# Patient Record
Sex: FEMALE | Race: WHITE
[De-identification: names, ages, dates, MRNs, and addresses within clinical notes are randomized per-mention and may not be internally consistent; named-entity substitution may affect disease eponyms.]

---

## 2016-11-20 NOTE — PD
HPI


Chief Complaint:  Psychiatric Symptoms


Time Seen by Provider:  12:43


Travel History


International Travel<30 days:  No


Contact w/Intl Traveler<30days:  No


Traveled to known affect area:  No





History of Present Illness


HPI


Patient is a 60-year-old female with a history of [place]'s disease [facility] 

[facility].  Per the report her son who also has Maricao'

s called police because his mother has been expressing suicidal ideation 

lately.  When they arrived the patient locked the doors and refused to let them 

in the sun and opened a back door and when they went in she was asking if 

euthanasia was legal in [place] and if the hospital would give her morphine so 

she could sleep and "go home to [facility] ".  The patient denies any suicide 

attempts but states that she has been considering suicide or euthanasia as an 

option when her disease progresses.  She is not in any pain and denies any 

cognitive decline.  She denies a history of depression, anxiety, bipolar 

disorder and schizophrenia.  She is a smoker but denies alcohol and illicit 

drug use.  She denies any other medical problems including cardiac or pulmonary 

disease and has no medical complaints at this time.





[facility]


Social History


Tobacco Use:  Yes





Allergies-Medications


(Allergen,Severity, Reaction):  


Coded Allergies:  


     No Known Allergies (Unverified , 11/20/16)


Reported Meds & Prescriptions








Reported Meds & Active Scripts


Active


Macrobid (Nitrofurantoin Monoh/Nitrofur Macro) 100 Mg Cap 100 Mg PO BID 5 Days











Review of Systems


Except as stated in HPI:  all other systems reviewed are Neg





Physical Exam


Narrative


GENERAL: Well-developed and well-nourished adult female in no acute distress.


SKIN: Warm and dry.  Good turgor without tenting.


HEAD: Normocephalic and atraumatic.


EYES: PERRL bilaterally, 5mm. EOMI bilateral. No injection or icterus present. 

No proptosis. Lids without edema or erythema.


ENT: Buccal mucosa pink and moist. Oropharynx free of erythema, tonsillar 

hypertrophy, masses, swelling, asymmetry and exudates. Uvula midline and airway 

patent.


NECK: Supple, no meningeal signs. Trachea midline, no JVD. No cervical or 

facial lymphadenopathy.


CARDIOVASCULAR: Regular rate and rhythm without murmurs, rubs, clicks or 

gallops. Radial and posterior tibial pulses 2+ bilaterally. No pedal edema.


RESPIRATORY: Clear to auscultation bilaterally with symmetrical rise and fall, 

no distress or use of accessory muscles.


GASTROINTESTINAL: Nontender nondistended, no discoloration.  No surgical scars 

present.  Normal bowel sounds all 4 quadrants.  No organomegaly.


MUSCULOSKELETAL: Patient freely moving all four extremities spontaneously. 

Extremities without clubbing, cyanosis, or edema. No obvious deformities. 


NEUROLOGIC: CN II-XII grossly intact. Awake and alert and oriented.  Chorea 

movements of the face, head, shoulders and hips.  Discriminate gait.  No 

dystonia.  Slightly increased muscle tone without clonus.  Bilateral sign 

present.  Normal speech.


PSYCHIATRIC: Appropriate mood and affect; insight and judgment normal.





Data


Data


Last Documented VS





Vital Signs








  Date Time  Temp Pulse Resp B/P Pulse Ox O2 Delivery O2 Flow Rate FiO2


 


11/20/16 12:34 98.6 92 24 126/68 97   








Orders





 Complete Blood Count With Diff (11/20/16 12:39)


Comprehensive Metabolic Panel (11/20/16 12:39)


Urinalysis - C+S If Indicated (11/20/16 12:39)


Drug Screen, Random Urine (11/20/16 12:39)


Alcohol (Ethanol) (11/20/16 12:39)


Salicylates (Aspirin) (11/20/16 12:39)


Tylenol (Acetaminophen) (11/20/16 12:39)


Psych Screen (11/20/16 12:39)


Urine Culture (11/20/16 12:53)


Nitrofurantoin Monohyd Macrocr (Macrobid (11/20/16 13:45)





Labs








 Laboratory Tests








Test 11/20/16 11/20/16





 12:46 12:53


 


White Blood Count 10.6 TH/MM3 


 


Red Blood Count 4.58 MIL/MM3 


 


Hemoglobin 14.2 GM/DL 


 


Hematocrit 42.0 % 


 


Mean Corpuscular Volume 91.6 FL 


 


Mean Corpuscular Hemoglobin 31.1 PG 


 


Mean Corpuscular Hemoglobin 33.9 % 





Concent  


 


Red Cell Distribution Width 13.7 % 


 


Platelet Count 393 TH/MM3 


 


Mean Platelet Volume 7.1 FL 


 


Neutrophils (%) (Auto) 56.0 % 


 


Lymphocytes (%) (Auto) 30.9 % 


 


Monocytes (%) (Auto) 6.4 % 


 


Eosinophils (%) (Auto) 5.5 % 


 


Basophils (%) (Auto) 1.2 % 


 


Neutrophils # (Auto) 5.9 TH/MM3 


 


Lymphocytes # (Auto) 3.3 TH/MM3 


 


Monocytes # (Auto) 0.7 TH/MM3 


 


Eosinophils # (Auto) 0.6 TH/MM3 


 


Basophils # (Auto) 0.1 TH/MM3 


 


CBC Comment DIFF FINAL  


 


Differential Comment   


 


Sodium Level 139 MEQ/L 


 


Potassium Level 4.0 MEQ/L 


 


Chloride Level 104 MEQ/L 


 


Carbon Dioxide Level 28.4 MEQ/L 


 


Anion Gap 7 MEQ/L 


 


Blood Urea Nitrogen 5 MG/DL 


 


Creatinine 0.67 MG/DL 


 


Estimat Glomerular Filtration 90 ML/MIN 





Rate  


 


Random Glucose 119 MG/DL 


 


Calcium Level 8.6 MG/DL 


 


Total Bilirubin 0.6 MG/DL 


 


Aspartate Amino Transf 21 U/L 





(AST/SGOT)  


 


Alanine Aminotransferase 35 U/L 





(ALT/SGPT)  


 


Alkaline Phosphatase 68 U/L 


 


Total Protein 7.0 GM/DL 


 


Albumin 3.8 GM/DL 


 


Salicylates Level 3.3 MG/DL 


 


Acetaminophen Level LESS THAN 2.0 





 MCG/ML 


 


Ethyl Alcohol Level LESS THAN 3 





 MG/DL 


 


Urine Color  LIGHT-YELLOW 


 


Urine Turbidity  HAZY 


 


Urine pH  6.5 


 


Urine Specific Gravity  1.006 


 


Urine Protein  NEG mg/dL


 


Urine Glucose (UA)  NEG mg/dL


 


Urine Ketones  NEG mg/dL


 


Urine Occult Blood  SMALL 


 


Urine Nitrite  POS 


 


Urine Bilirubin  NEG 


 


Urine Urobilinogen  LESS THAN 2.0





  MG/DL


 


Urine Leukocyte Esterase  LARGE 


 


Urine RBC  10 /hpf


 


Urine WBC  177 /hpf


 


Urine WBC Clumps  FEW 


 


Urine Squamous Epithelial  4 /hpf





Cells  


 


Urine Bacteria  MANY /hpf


 


Microscopic Urinalysis Comment  CULTURE





  INDICATED


 


Urine Opiates Screen  NEG 


 


Urine Barbiturates Screen  NEG 


 


Urine Amphetamines Screen  NEG 


 


Urine Benzodiazepines Screen  NEG 


 


Urine Cocaine Screen  NEG 


 


Urine Cannabinoids Screen  NEG 














MDM


Medical Decision Making


Medical Screen Exam Complete:  Yes


Emergency Medical Condition:  Yes


Interpretation(s)





Laboratory Tests








Test 11/20/16 11/20/16





 12:46 12:53


 


White Blood Count 10.6 TH/MM3 





 (4.0-11.0) 


 


Red Blood Count 4.58 MIL/MM3 





 (4.00-5.30) 


 


Hemoglobin 14.2 GM/DL 





 (11.6-15.3) 


 


Hematocrit 42.0 % 





 (35.0-46.0) 


 


Mean Corpuscular Volume 91.6 FL 





 (80.0-100.0) 


 


Mean Corpuscular Hemoglobin 31.1 PG 





 (27.0-34.0) 


 


Mean Corpuscular Hemoglobin 33.9 % 





Concent (32.0-36.0) 


 


Red Cell Distribution Width 13.7 % 





 (11.6-17.2) 


 


Platelet Count 393 TH/MM3 





 (150-450) 


 


Mean Platelet Volume 7.1 FL 





 (7.0-11.0) 


 


Neutrophils (%) (Auto) 56.0 % 





 (16.0-70.0) 


 


Lymphocytes (%) (Auto) 30.9 % 





 (9.0-44.0) 


 


Monocytes (%) (Auto) 6.4 % (0.0-8.0) 


 


Eosinophils (%) (Auto) 5.5 % (0.0-4.0) 


 


Basophils (%) (Auto) 1.2 % (0.0-2.0) 


 


Neutrophils # (Auto) 5.9 TH/MM3 





 (1.8-7.7) 


 


Lymphocytes # (Auto) 3.3 TH/MM3 





 (1.0-4.8) 


 


Monocytes # (Auto) 0.7 TH/MM3 





 (0-0.9) 


 


Eosinophils # (Auto) 0.6 TH/MM3 





 (0-0.4) 


 


Basophils # (Auto) 0.1 TH/MM3 





 (0-0.2) 


 


CBC Comment DIFF FINAL  


 


Differential Comment   


 


Sodium Level 139 MEQ/L 





 (136-145) 


 


Potassium Level 4.0 MEQ/L 





 (3.5-5.1) 


 


Chloride Level 104 MEQ/L 





 () 


 


Carbon Dioxide Level 28.4 MEQ/L 





 (21.0-32.0) 


 


Anion Gap 7 MEQ/L (5-15) 


 


Blood Urea Nitrogen 5 MG/DL (7-18) 


 


Creatinine 0.67 MG/DL 





 (0.50-1.00) 


 


Estimat Glomerular Filtration 90 ML/MIN (>89) 





Rate  


 


Random Glucose 119 MG/DL 





 () 


 


Calcium Level 8.6 MG/DL 





 (8.5-10.1) 


 


Total Bilirubin 0.6 MG/DL 





 (0.2-1.0) 


 


Aspartate Amino Transf 21 U/L (15-37) 





(AST/SGOT)  


 


Alanine Aminotransferase 35 U/L (10-53) 





(ALT/SGPT)  


 


Alkaline Phosphatase 68 U/L () 


 


Total Protein 7.0 GM/DL 





 (6.4-8.2) 


 


Albumin 3.8 GM/DL 





 (3.4-5.0) 


 


Salicylates Level 3.3 MG/DL 





 (2.8-20.0) 


 


Acetaminophen Level LESS THAN 2.0 





 MCG/ML 





 (10.0-30.0) 


 


Ethyl Alcohol Level LESS THAN 3 





 MG/DL (0-5) 


 


Urine Color  LIGHT-YELLOW





  (YELLW/STRAW)


 


Urine Turbidity  HAZY  (CLEAR)


 


Urine pH  6.5  (5.0-8.5)


 


Urine Specific Gravity  1.006





  (1.002-1.035)


 


Urine Protein  NEG mg/dL





  (NEG-TRACE)


 


Urine Glucose (UA)  NEG mg/dL (NEG)


 


Urine Ketones  NEG mg/dL (NEG)


 


Urine Occult Blood  SMALL  (NEG)


 


Urine Nitrite  POS  (NEG)


 


Urine Bilirubin  NEG  (NEG)


 


Urine Urobilinogen  LESS THAN 2.0





  MG/DL (LESS





  THAN 2.0)


 


Urine Leukocyte Esterase  LARGE  (NEG)


 


Urine RBC  10 /hpf (0-3)


 


Urine WBC  177 /hpf (0-5)


 


Urine WBC Clumps  FEW  (NONE)


 


Urine Squamous Epithelial  4 /hpf (0-5)





Cells  


 


Urine Bacteria  MANY /hpf





  (NONE)


 


Microscopic Urinalysis Comment  CULTURE





  INDICATED


 


Urine Opiates Screen  NEG  (NEG)


 


Urine Barbiturates Screen  NEG  (NEG)


 


Urine Amphetamines Screen  NEG  (NEG)


 


Urine Benzodiazepines Screen  NEG  (NEG)


 


Urine Cocaine Screen  NEG  (NEG)


 


Urine Cannabinoids Screen  NEG  (NEG)








Differential Diagnosis


SI versus depression versus anxiety versus bipolar disorder versus 

schizophrenia versus  substance abuse versus mood disorder versus personality 

disorder versus adjustment disorder


Narrative Course


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 

in by [place] for expressing suicidal ideation.  Patient admits to wanting to 

seek options for a euthanasia in the future as her diseases progressing and 

fatal.  She denies any acute worsening and she is on any pain.  She denies any 

history of mental illness.  She denies any suicide attempts.  She is pleasant 

and answers questions and denies any medical problems at this time.  Denies any 

neurological findings from her Maricao's disease her exam is normal.  

Ordered labs per protocol.  CBC shows a WBC of 10.6 without a neutrophilia or 

bands.  H&H normal.  Metabolic panel shows BUN 5, glucose 119.  Serum benefit 

level less than 2, salicylates 3.3, ethanol less than 3.  Urinalysis shows 

positive for nitrites, leukocyte esterase, 10 RBCs, 177 WBCs with clumps and 

many bacteria.  The patient continues to deny any  symptoms and her abdominal 

exam is benign.  She is afebrile and nontoxic appearing.  First dose of 

Macrobid given here with a prescription for the remainder.  Heart rate when 

vitals were performed was 92 however on exam was 84 and regular.  She has no 

leukocytosis, is afebrile and nontoxic-appearing and has no systemic complaints 

no additional workup is needed at this time.  Patient is medically cleared to 

proceed with psych evaluation.





Diagnosis





 Primary Impression:  


 Cystitis


 Additional Impressions:  


 Suicidal ideations


 Maricao disease


Patient Instructions:  General Instructions, Urinary Tract Infection in Women (

ED)





***Additional Instructions:


Take medication as prescribed


Drink plenty of fluids to stay hydrated and flush urinary system


Call for culture results and follow up with PCP in 48 hours


Return to the ED for any acute worsening of symptoms including fever, nausea, 

vomiting


***Med/Other Pt SpecificInfo:  Prescription(s) given


Scripts


Nitrofurantoin Monohydrate Macrocrystals (Macrobid)100 Mg [id number] Mg PO BID  5 

Days  Ref 0


   Prov:Osmar Gomez MD         11/20/16


Condition:  Stable








Gage Russo III Nov 20, 2016 12:50

## 2016-11-21 NOTE — HHI.HP
Provisional Diagnosis


Admission Date





Axis I.


Major depressive disorder, single episode, without psychotic features versus 

medical induced mood disorder


Axis II.


Deferred


Axis III.


Ciarra disease


Axis IV.


Family conflicts


Axis V.


40





                               Certification of Person's Competence 


                           To Provide Express and Informed Consent





I have personally examined Angelic Joseph , a person being served at [facility] on, Nov 21, 2016 10:11.


Express and informed consent means consent voluntarily given in writing, by a 

competent person, after sufficient explanation and disclosure of the subject 

matter involved to enable the person to make a knowing and willful decision 

without any element of force, fraud, deceit, duress, or other form of 

constraint or coercion.





This person is 18 years of age or older, is not now known to be incompetent to 

consent to treatment with a guardian advocate, and does not have a health care 

surrogate or proxy currently making medical treatment decisions.  I have found 

this person to be one of the following:





[] Competent to provide express and informed consent, as defined above, for 

voluntary admission to this facility and is competent to provide express and 

informed consent for treatment.  He/she has the consistent capacity to make 

well reasoned, willful, and knowing decisions concerning his or her medical or 

mental health treatment.  The person fully and consistently understands the 

purpose of the admission for examination/placement and is fully capable of 

personally exercising all rights [place]





[] Incompetent to provide express and informed consent to voluntary admission, 

and this is incompetent to provide express and informed consent to treatment.  

The person must be transferred to involuntary status and a petition for a 

guardian advocate filed with the Circuit Court.





[X] Refusing to provide express and informed consent to voluntary admission but 

is competent to provide express and informed consent for treatment.  The person 

must be discharged or transferred to involuntary status.





Form shall be completed within 24 hours of a person's arrival at the receiving 

facility and filed in the clinical record of each person:


1. Admitted on a voluntary basis


2. Permitted to provide express and informed consent to his/her own treatment


3. Allowed to transfer from involuntary to voluntary status


4. Prior to permitting a person to consent to his or her own treatment after 

having been previously found incompetent to consent to treatment.





History of Present Illness


Capacity:  Has Capacity


HPI


The patient is a 60-year-old  woman, domicile with her son, unemployed

, disabled, without any previous psychiatric history, no previous suicidal 

attempts, medical history of [place]'s disease [facility].  Per the report her son who also has [place]'s called police 

because his mother has been expressing suicidal ideation lately.  When they 

arrived the patient locked the doors and refused to let them in the sun and 

opened a back door and when they went in she was asking if euthanasia was legal 

in [place] and if the hospital would give her morphine so she could sleep and 

"go home to [facility] ".  As per ER notes, The patient denies any suicide attempts 

but states that she has been considering suicide or euthanasia as an option 

when her disease progresses.  Chart was reviewed, case discussed with ER 

medical staff, no collateral information available, patient was seen and 

evaluated in the [facility].  On evaluation patient states that she doesn't need to 

be here, she is states that the reason she was brought to the hospital is 

because her son doesn't want to pay the rent and wants to get rid of her.  

Patient denies depressive symptoms, she denies anhedonia, she denies 

hopelessness, helplessness, she denies suicidal or homicidal ideation.  However

, she does states that soon or later as per Ciarra's disease is 

deteriorated her health she is going to look for options to end her life in a 

painless manner, but she is now planning to commit suicide.  At this moment, 

she says, she still enjoy life, is able to function, to take care of herself, 

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 

these abilities.  Patient denies past or current devonte, denies anxiety, denies 

perceptual disturbances, such as visual and auditory hallucinations.  Patient 

is fully oriented 3, no gross cognitive impairment observed.





Review of Systems


Constitutional:  DENIES: Diaphoretic episodes, Fatigue, Fever, Weight gain, 

Weight loss, Chills, Dizziness, Change in appetite, Night Sweats


Eyes:  DENIES: Blurred vision, Diplopia, Eye inflammation, Eye pain, Vision loss

, Photosensitivity, Double Vision


Ears, nose, mouth, throat:  DENIES: Tinnitus, Hearing loss, Vertigo, Nasal 

discharge, Oral lesions, Throat pain, Hoarseness, Ear Pain, Running Nose, 

Epistaxis, Sinus Pain, Toothache, Odynophagia


Respiratory:  DENIES: Apneas, Cough, Snoring, Wheezing, Hemoptysis, Sputum 

production, Shortness of breath


Cardiovascular:  DENIES: Chest pain, Palpitations, Syncope, Dyspnea on Exertion

, PND, Lower Extremity Edema, Orthopnea, Claudication


Gastrointestinal:  DENIES: Abdominal pain, Black stools, Bloody stools, 

Constipation, Diarrhea, Nausea, Vomiting, Difficulty Swallowing, Anorexia


Genitourinary:  DENIES: Abnormal vaginal bleeding, Dysmenorrhea, Dyspareunia, 

Sexual dysfunction, Urinary frequency, Urinary incontinence, Urgency, Hematuria

, Dysuria, Nocturia, Vaginal discharge


Musculoskeletal:  DENIES: Joint pain, Muscle aches, Stiffness, Joint Swelling, 

Back pain, Neck pain


Integumentary:  DENIES: Abnormal pigmentation, Pruritus, Rash, Nail changes, 

Breast masses, Breast skin changes, Nipple discharge


Immunologic/allergic:  DENIES: Eczema, Urticaria


Psychiatric:  DENIES: Anxiety, Confusion, Mood changes, Depression, 

Hallucinations, Agitation, Suicidal Ideation, Homicidal Ideation, Delusions


Other


Neurology: [place] chorea observed, with involuntary movement, lack of 

coordination and unsteady gait observed.





Past Psych History


Violence risk - self (6 mos)


Elevated





Substance Abuse History


Drugs/Alcohol past 12 months


She denies





Past Family Social History


Coded Allergies:  


     No Known Allergies (Unverified , 11/20/16)


Active Scripts


Nitrofurantoin Monohydrate Macrocrystals (Macrobid)100 Mg [id number] Mg PO BID  5 

Days  Ref 0


   Prov:Osmar Gomez MD         11/20/16





 Current Medications








 Medications


  (Trade)  Dose


 Ordered  Sig/Joe


 Route  Start Time


 Stop Time Status Last Admin


 


  (Macrobid)  100 mg  ONCE  ONCE


 PO  11/21/16 10:15


 11/21/16 10:16   


 


 


  (Ativan)  1 mg  Q6H  PRN


 PO  11/21/16 10:15


   UNV  


 


 


  (Ativan Inj)  1 mg  Q6H  PRN


 IM  11/21/16 10:15


   UNV  


 


 


  (Ativan)  0.5 mg  Q12H  PRN


 PO  11/21/16 10:15


   UNV  


 


 


  (Ativan Inj)  0.5 mg  Q12H  PRN


 IM  11/21/16 10:15


   UNV  


 


 


  (Tylenol)  650 mg  Q4H  PRN


 PO  11/21/16 10:15


   UNV  


 


 


  (Milk Of


 Magnesia Liq)  30 ml  DAILY  PRN


 PO  11/21/16 10:15


   UNV  


 


 


  (Mag-Al Plus


 Susp Liq)  30 ml  Q6H  PRN


 PO  11/21/16 10:15


   UNV  


 


 


  (Habitrol 21 Mg


 Patch.24 Hr)  1 patch  DAILY


 T-DERMAL  11/22/16 09:00


   UNV  


 








Family History


Her father, 2 of her siblings, her 2 kids have [place] disease


Social History


Patient was born and raised in [place], she lives with her son, she is single

, unemployed, has been living in [place] for 8 years





Physical Exam


Vital Signs





 Vital Signs








  Date Time  Temp Pulse Resp B/P Pulse Ox O2 Delivery O2 Flow Rate FiO2


 


11/21/16 06:19  70  115/69    


 


11/21/16 01:58   16     


 


11/20/16 17:30 97.6    96 Room Air  











Mental Status Examination


Appearance


 woman, skinny, who appears her stated age, she is cooperative, a 

little bit irritable, uncoordinated movement, chorea, unsteady gait observed


Speech:  Slow, Stuttering


Orientation:  x3


Memory:  Unremarkable


Thought Process:  Logical


Thought Content:  Unremarkable


Hallucination Type:  None


Attention and Concentration:  Good


Suicidal Ideation:  No


Previous Suicide Attempts:  No


Homicidal Ideation:  No


Previous Homicide Attempts:  No


Insight:  Fair


Judgement:  WNL


Affect:  Irritable


Mood:  Angry


Motor Activity:  Abnormal gait-specify (unsteady gait)





Problem List:  


(1) Major depressive disorder


Assessment & Plan:  On evaluation patient is irritable, she denies suicidal 

ideation, she denies depressive symptoms, denies devonte and perceptual 

disturbances.  However, Haddad act documentation shows that her kids has been 

very concerned because patient has been expressing suicidal ideation.  On 

evaluation today, even though she doesn't express suicidal ideation, she does 

report the desire of ending her life "in a painless manner"as her physical 

condition is deteriorating due to Ciarra disease.  On the evaluation no 

gross cognitive impairment or angela psychosis is observed.  Neuropsychiatric 

symptoms are very well-known consequences of Ciarra disease, but this does 

not seem to be the case in this patient.  I think that the patient has an 

existential SI due to her severe and debilitating medical condition and not due 

specifically to depression.  She poses a very high risk for suicidality.  

Collateral information from her kids and more longitudinal observation is 

crucial in order to complete the psychiatric assessment and and completed the 

picture.  Meanwhile, due to her elevated risk for suicidality, patient needs 

psychiatric admission.


ICD Code:  F32.9


Assessment & Plan


Estimated LOS:  days





Problem Qualifiers





(1) Major depressive disorder:  





Jorge Taylor MD Nov 21, 2016 10:30

## 2016-11-22 NOTE — PD.CONS
Provisional Diagnosis


Admission Date


Nov 21, 2016 at 10:11


[place] I.


Major depressive disorder, single episode, without psychotic features versus 

medical induced mood disorder


Axis II.


Deferred


Axis III.


Culebra disease


Axis IV.


Family conflicts


Axis V.


40





History of Present Illness


Service


Psychiatry


Consult Requested By





Primary Care Physician


No Primary Care Physician


HPI


The patient is a 60-year-old  woman, domicile with her son, unemployed

, disabled, without any previous psychiatric history, no previous suicidal 

attempts, medical history of Culebra's disease [facility] 

[facility]addad act.  Per the report her son who also has Culebra's called police 

because his mother has been expressing suicidal ideation lately.  When they 

arrived the patient locked the doors and refused to let them in the sun and 

opened a back door and when they went in she was asking if euthanasia was legal 

in [place] and if the hospital would give her morphine so she could sleep and 

"go home to [facility] ".  As per ER notes, The patient denies any suicide attempts 

but states that she has been considering suicide or euthanasia as an option 

when her disease progresses.  Chart was reviewed, case discussed with ER 

medical staff, no collateral information available, patient was seen and 

evaluated in the [facility].  On evaluation patient states that she doesn't need to 

be here, she is states that the reason she was brought to the hospital is 

because her son doesn't want to pay the rent and wants to get rid of her.  

Patient denies depressive symptoms, she denies anhedonia, she denies 

hopelessness, helplessness, she denies suicidal or homicidal ideation.  However

, she does states that soon or later as per Culebra's disease is 

deteriorated her health she is going to look for options to end her life in a 

painless manner, but she is now planning to commit suicide.  At this moment, 

she says, she still enjoy life, is able to function, to take care of herself, 

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 

these abilities.  Patient denies past or current devonte, denies anxiety, denies 

perceptual disturbances, such as visual and auditory hallucinations.  Patient 

is fully oriented 3, no gross cognitive impairment observed. 





11/22/16


Above note dictated by Dr. Herron review noted and agreed with.  Patient is 

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 Herron 

to Dr. johnson nurse service.  Patient chart reviewed and patient seen with nurse 

eye on unit.  Dr. membreno his sign first opinion petition supporting Haddad act.  

I agree.  Patient meets criteria for involuntary psychiatric hospitalization 

under the [place] act thus I will cosign second opinion petition supporting Haddad 

act





Past Family Social History


Coded Allergies:  


     No Known Allergies (Unverified , 11/20/16)


Active Scripts


Nitrofurantoin Monohydrate Macrocrystals (Macrobid)100 Mg Xrg802 Mg PO BID  5 

Days  Ref 0


   Prov:Osmar Gomez MD         11/20/16





 Current Medications








 Medications


  (Trade)  Dose


 Ordered  Sig/Joe


 Route  Start Time


 Stop Time Status Last Admin


 


  (Ativan)  1 mg  Q6H  PRN


 PO  11/21/16 10:15


     


 


 


  (Ativan Inj)  1 mg  Q6H  PRN


 IM  11/21/16 10:15


     


 


 


  (Ativan)  0.5 mg  Q12H  PRN


 PO  11/21/16 10:15


     


 


 


  (Ativan Inj)  0.5 mg  Q12H  PRN


 IM  11/21/16 10:15


     


 


 


  (Tylenol)  650 mg  Q4H  PRN


 PO  11/21/16 10:15


     


 


 


  (Milk Of


 Magnesia Liq)  30 ml  DAILY  PRN


 PO  11/21/16 10:15


     


 


 


  (Mag-Al Plus


 Susp Liq)  30 ml  Q6H  PRN


 PO  11/21/16 10:15


     


 


 


  (Habitrol 21 Mg


 Patch.24 Hr)  1 patch  DAILY


 T-DERMAL  11/22/16 09:00


    11/22/16 09:00


 


 


  (SEROquel)  25 mg  BID@09,12


 PO  11/22/16 09:00


    11/22/16 09:46


 


 


  (SEROquel)  100 mg  HS


 PO  11/22/16 21:00


     


 











Physical Exam


Vital Signs





 Vital Signs








  Date Time  Temp Pulse Resp B/P Pulse Ox O2 Delivery O2 Flow Rate FiO2


 


11/22/16 05:47 98.4 64 16 103/63 97   


 


11/20/16 17:30      Room Air  











Mental Status Examination


Alert irritable paranoid thin slender white female with significant chorea type 

movements both upper extremities upper torso head and neck guarded with me


Appearance


Somewhat disheveled obvious Ciarra's chorea type movements noted


Speech:  Slow, Stuttering


Orientation:  x3


Memory:  Unremarkable


Thought Process:  Logical


Thought Content:  Unremarkable


Language


Fair


Fund of Knowledge


Fair


Hallucination Type:  None


Attention and Concentration:  Good


Suicidal Ideation:  No (denies at this time)


Previous Suicide Attempts:  No


Homicidal Ideation:  No


Previous Homicide Attempts:  No


Insight:  Poor


Judgement:  WNL (fair)


Affect:  Irritable


Mood:  Angry


Motor Activity:  Abnormal gait-specify (unsteady gait)





Problem List:  


(1) Major depressive disorder


ICD Code:  F32.9


Assessment & Plan


Estimated LOS:  days





Problem Qualifiers





(1) Major depressive disorder:  





Gage Bob MD Nov 22, 2016 11:11

## 2016-11-22 NOTE — HHI.PYPN
Subjective


Remarks


Patient seen, chart reviewed, case discussed with staff


Patient is taking her Seroquel 25/25/100 today.  She reports she finds it 

calming and she reports she likes it and will continue to take it.


Patient presently is continuing to talk to me about suicidal thoughts.  She is 

talking about ordering poisonous herbs from the Internet after she is discharged

; for example she mentioned catnip and dandelion roots which she believes she 

can order online.  This train of thought, which she is continuing in even when 

she reports feeling better today, is worrisome.


Daughter called and gave report of mom being aggressive and overwhelmed at 

home.  Daughter feels mom would do better if placed in assisted living.  Mom 

also reports she does not feel she should return to the family home.


Patient continues to deny depression,  Says she wants to kill herself as part 

of a desire to move on in a positive fashion.





Review of Systems


Neurologic:  COMPLAINS OF: Tremor


Psychiatric:  COMPLAINS OF: Anxiety, Confusion, Mood changes, Depression, 

Suicidal Ideation


Other


otherwise 10 point ROS is negative





Objective


Alert:  Yes


[place]:  Person, Place, Situation


Mood:  Agitated, Anxious, Depressed


Affect:  Labile


Memory Intact:  Immediate


Hallucinations:  Other (patient denies)


Delusions:  Yes


Delusion Type:  Other


Suicidal:  Ideation (prominent death wishes)


Homicidal:  Ideation (denies)


Insight/Judgement


odd views about death and dying


Labs











Test 11/22/16





 08:41


 


Sodium Level 137 MEQ/L


 


Potassium Level 3.8 MEQ/L


 


Chloride Level 98 MEQ/L


 


Carbon Dioxide Level 28.6 MEQ/L


 


Anion Gap 10 MEQ/L


 


Blood Urea Nitrogen 9 MG/DL


 


Creatinine 0.81 MG/DL


 


Estimat Glomerular Filtration 73 ML/MIN





Rate 


 


Random Glucose 129 MG/DL


 


Hemoglobin A1c 5.1 %


 


Calcium Level 9.2 MG/DL


 


Triglycerides Level 113 MG/DL


 


Cholesterol Level 193 MG/DL


 


LDL Cholesterol 80 MG/DL


 


HDL Cholesterol 90.9 MG/DL


 


Cholesterol/HDL Ratio 2.12 RATIO














 Date/Time Procedure Status





Source Growth 


 


 11/20/16 12:53 Urine Culture - Final Complete





Urine Clean Catch Escherichia Coli 








Vitals/IOs





 Vital Signs








  Date Time  Temp Pulse Resp B/P Pulse Ox O2 Delivery O2 Flow Rate FiO2


 


11/22/16 05:47 98.4 64 16 103/63 97   


 


11/20/16 17:30      Room Air  











Problem List:  


(1) Major depressive disorder, recurrent, severe with psychotic features


Assessment & Plan:  I believe this patients strong death wish is a strong 

indication of severe hopelessness and I consider this a psychotic feature.  In 

addition I consider her thoughts about dying odd and fundamentally psychotic.  

I believe neuroleptic treatment is indicated and I am happy to say she feels 

Seroquel is relieving what she refers to as her anxiety.  We will continue with 

this and raise this as needed.  Augmenting with antidepressants as patient 

improves and becomes more accepting of medications.


ICD Code:  F33.3


Assessment & Plan


Estimated LOS:  days








Anu Ribeiro MD Nov 22, 2016 16:42

## 2016-11-23 NOTE — HHI.PYPN
Subjective


Remarks


Patient seen, chart reviewed, case discussed with staff


Patient reports she is much better on the 25/25/100 Seroqeul.  She is also 

reporting that her daughter wants her back home to live with her, however the 

phone number she gave me is a non working number.  Furthermore, when I said 

that social work would have to speak to her daughter and make sure it is a safe 

discharge she suddenly got violently angry and started talking about killing 

herself again..  Suggesting problematic lability and a worrisome low 

frustration tolerance.


Patient has been isolating in bed much of the day.  Does not complain of 

medication side effects.





Review of Systems


Psychiatric:  COMPLAINS OF: Anxiety, Confusion, Mood changes, Depression, 

Agitation, Suicidal Ideation


Other


otherwise 10 point ROS is negative





Objective


Alert:  Yes


[place]:  Person, Place, Situation


Mood:  Agitated, Anxious, Depressed


Affect:  Labile


Memory Intact:  Immediate


Hallucinations:  Other (patient denies)


Delusions:  Yes


Delusion Type:  Other


Suicidal:  Ideation (continues)


Homicidal:  Ideation (denies)


Insight/Judgement


poor


Labs











 Date/Time Procedure Status





Source Growth 


 


 11/20/16 12:53 Urine Culture - Final Complete





Urine Clean Catch Escherichia Coli 








Vitals/IOs





 Vital Signs








  Date Time  Temp Pulse Resp B/P Pulse Ox O2 Delivery O2 Flow Rate FiO2


 


11/23/16 06:11 97.2 72 18 91/74 96   


 


11/20/16 17:30      Room Air  











Problem List:  


(1) Major depressive disorder, recurrent, severe with psychotic features


Assessment & Plan:  patient is still very unstable.  I have not spoken to her 

daughter to verify that Angelic does have a place to go home to.  Will have 

social work look into this.  


There is improvement with the Seroquel, however the patient is not yet stable 

yet.  Will attempt to improve stability, however I am concerned that patient is 

sleep on Seroquel (ie staying in room in bed) and will wait to increase, and or 

consider other meds in daytime.


ICD Code:  F33.3


Assessment & Plan


Estimated LOS:  Anu Gonzalez MD Nov 23, 2016 14:45

## 2016-11-24 NOTE — HHI.PYPN
Subjective


Remarks


Patient seen, chart reviewed, case discussed with staff.


Patient seen on both 2600 and 2700 today.  Patient has been demanding immediate 

release, but has given me a phone number which she reports is that of her 

daughter and which no one answers.  In addition she continues to threaten to 

kill herself if things do not go her way.  


Patient was trying to escape the Unit.  She is psychotic and talking about 

 and people who want her to come live with them.





Review of Systems


Neurologic:  COMPLAINS OF: Tremor, Poor Balance


Psychiatric:  COMPLAINS OF: Anxiety, Confusion, Mood changes, Depression, 

Agitation, Suicidal Ideation, Delusions


Other


otherwise 10 point ROS is negative





Objective


Alert:  Yes


[place]:  Person, Place, Situation


Mood:  Agitated, Anxious, Depressed


Affect:  Labile


Memory Intact:  Immediate


Hallucinations:  Other (patient denies)


Delusions:  Yes


Delusion Type:  Other


Suicidal:  Ideation (continues)


Homicidal:  Ideation (denies)


Insight/Judgement


poor


Labs











 Date/Time Procedure Status





Source Growth 


 


 11/20/16 12:53 Urine Culture - Final Complete





Urine Clean Catch Escherichia Coli 








Vitals/IOs





 Vital Signs








  Date Time  Temp Pulse Resp B/P Pulse Ox O2 Delivery O2 Flow Rate FiO2


 


11/24/16 05:57 97.3 71 20 104/64 100   


 


11/20/16 17:30      Room Air  











Problem List:  


(1) Major depressive disorder, recurrent, severe with psychotic features


Assessment & Plan:  The patient is expressing more psychotic feature than were 

originally noticed.  In addition she is now refusing medication and insisting 

on leaving the hospital.  It appears likely that she needs to have a Guardian 

Advocate appointed to approve medications, and she may need some ETOs if her 

behavioral dyscontrol continues.  She has been transferred to the 2700 Unit so 

she can be observed in the Camera rooms.


ICD Code:  F33.3


Assessment & Plan


Estimated LOS:  days








Anu Ribeiro MD Nov 24, 2016 14:24

## 2016-11-25 NOTE — HHI.PYPN
Subjective


Remarks


I am assuming care of patient from Dr. Ribeiro.  Patient seen and examined with 

counselor Lea.  Chart reviewed.  Case discussed with nursing staff.  Patient 

was apparently transferred from the 2600 to the 2700 unit because she became 

agitated and threw food at staff.  Behavior has been under better control since 

transferred to the 2700 unit.  On my examination today, the patient presents as 

intensely dysphoric and paranoid.  She is impulsive with and seems to exhibit 

impaired executive function.  She says "I don't need to be here.  I plan to get 

the owner of this place involved.  You're forcing me onto drugs.  I'll place is 

going to be closed down, sweetie.  You stole me from my home.  Get a heart.  

Get a heart.  Mireille Delacruz."  She says she wants to be discharged, and she 

will go home and "let go" of her life.  She says the Seroquel, "turns me into la

-la," and I note she has stopped taking it.  





Spoke with patient's son, Daniel Noe, over the phone [phone]: He 

reports pt has been symptomatic from HD for 5-10 years but has been steadily 

declining in the last 2-3 years.  He notes that she has had "phases" of 

paranoia and aggressive behavior in the past.  She thought her water was 

poisoned at one point and refused to shower, he says.  He notes that she has 

made prior threats of suicide but has never acted on a suicide plan.  He notes 

she often refuses medications and does not like doctors or hospitals.  He needs 

to talk it over with his sister, but he is thinking pt may need placement.  He 

is eldest child and pt is reportedly unmarried, and he agrees to act as HCS/GA.

  He thanks me for the call.





Also tried to call daughter, Kirti Noe at [phone], no answer.





Review of Systems


ROS Limitations:  Uncooperative, Poor Historian


Other


No reported somatic complaints.





Objective


Alert:  Yes


[place]:  Person, Place


Mood:  Agitated, Angry, Anxious


Affect:  Restricted (dysphoric)


Memory Intact:  Comment (Perhaps mild impairment on clinical exam but not 

formally assessed.)


Hallucinations:  Other (No AVH)


Delusions:  Yes


Delusion Type:  Paranoid


Suicidal:  Ideation (Ongoing SI)


Homicidal:  Ideation (No HI)


Insight/Judgement


Poor


Remarks


Chorea evident.  Speech wnl for rate, tone, volume.  Thought process 

perseverative.


Labs











 Date/Time Procedure Status





Source Growth 


 


 11/20/16 12:53 Urine Culture - Final Complete





Urine Clean Catch Escherichia Coli 





Labs reviewed.  CBC, CMP, and toxicology results reviewed.  UA and culture 

results reviewed.  I note patient has not been continued on Macrobid tx for UTI.


Vitals/IOs





 Vital Signs








  Date Time  Temp Pulse Resp B/P Pulse Ox O2 Delivery O2 Flow Rate FiO2


 


11/25/16 06:06 97.2 63 16 115/78 98   











Problem List:  


(1) [place] disease


ICD Code:  G10


(2) Dementia


ICD Code:  F03.90


Assessment & Plan


History provided by son as well as patient's current presentation is concerning 

for a subcortical dementia from her [place] Disease, with behavioral 

disturbance, and I will adjust the provisional diagnosis accordingly.  There 

may also be a component of affective disorder, but this is perhaps secondary to 

the HD as well.  I will replace her Seroquel with Zyprexa PO/IM given 

nonadherence with Seroquel.  I will consult PT and initiate fall precautions.  

Consult to neurology to assess status of patient's HD.  Resume Macrobid x 7 

days for treatment of pan-sensitive E. coli UTI.  Transfer to 2500 unit when a 

bed is available; d/w Dr. Bob.  Utilize HCS/GA.  Continue other 

medications and care as ordered.


Discharge Planning


?Placement


Request HC Surrog/Guard Advoc?:  Yes





Problem Qualifiers





(1) Dementia:  


Qualified Code:  F02.81 - Dementia associated with other underlying disease 

with behavioral disturbance





José Mccray MD Nov 25, 2016 09:35

## 2016-11-25 NOTE — MB
cc:

HANNA HULL

****

 

 

DATE OF CONSULTATION

11/25/2016

 

REASON FOR CONSULTATION

Patient with history of Door disease and the question is any medications

that may be of benefit and the admitting consultant wants to inquire whether

the patient has a cortical dementia of Door's disease.

 

HISTORY OF PRESENT ILLNESS

I went to see the patient in the Psychiatry Unit. I met with the patient and

she was in the lobby and she was asked by the nurse to go back to her room.

She was evidently with choreiform movements involving the whole body.  However,

the patient denied that she needs to see a neurologist, "I do not want to see a

neurologist.  I know my diagnosis and all my family from my mother's side had

been diagnosed with Door's."  I explained to the patient why I am

consulted and why I am seeing her.  She declined taking any medication and she

did not want to be examined and she refused evaluation and assessment.

 

I let the nurse know about the situation and the nurse wanted to know about the

capacity of the patient to make decisions.  My answer is that this needs

further evaluation through mental status assessment and cooperative test that

is usually done in an outpatient setting and also can be done in inpatient

setting by a neurobehavioral expert.  I informed the nurse that of further

assistance is needed and the patient is agreeable to being reevaluated, I would

be more happy to see her again.

 

 

 

                              _________________________________

                              MD ROSIBEL Marsh/BENJAMIN

D:  11/25/2016/10:52 PM

T:  11/25/2016/11:39 PM

Visit #:  [id number]

Job #:  [id number]

MARILYN

## 2016-11-26 NOTE — HHI.PYPN
Subjective


Remarks


Pt seen and discussed with staff. She has been agitated and aggressive with 

staff.. She received Ativan IM x1 due to aggression. She states that the FBI is 

waiting upstairs to arrest RN staff. Interview cut short due to escalating 

agitation. No SI/HI.





Review of Systems


Psychiatric:  COMPLAINS OF: Agitation, Delusions





Objective


Alert:  Yes


[place]:  Person, Place


Mood:  Agitated, Angry, Anxious


Affect:  Restricted (dysphoric)


Memory Intact:  Comment (Perhaps mild impairment on clinical exam but not 

formally assessed.)


Hallucinations:  Other (No AVH)


Delusions:  Yes


Delusion Type:  Paranoid


Suicidal:  Ideation (Ongoing SI)


Homicidal:  Ideation (No HI)


Insight/Judgement


poor


Vitals/IOs





 Vital Signs








  Date Time  Temp Pulse Resp B/P Pulse Ox O2 Delivery O2 Flow Rate FiO2


 


11/25/16 20:22 97.4 93 18 92/52 95   








 Intake and Output








 11/25/16 11/25/16 11/26/16





 08:00 16:00 00:00


 


Intake Total   1080 ml


 


Balance   1080 ml











Problem List:  


(1) [place] disease


ICD Code:  G10


(2) Dementia


ICD Code:  F03.90


Assessment & Plan


Continue current tx plan. Estimated LOS:  days


Request HC Surrog/Guard Advoc?:  Yes





Problem Qualifiers





(1) Dementia:  


Qualified Code:  F02.81 - Dementia associated with other underlying disease 

with behavioral disturbance





Clarice Torres MD Nov 26, 2016 21:17

## 2016-11-27 NOTE — HHI.PYPN
Subjective


Remarks


Pt seen and discussed with staff. She continues to refuse medications but has 

been less agitated and hostile. Today pt has been out of room in dayroom 

engaging in unit activities at times. No SI/HI





Objective


Alert:  Yes


[place]:  Person, Place


Mood:  Anxious


Affect:  Labile


Memory Intact:  Comment (mild impairment)


Hallucinations:  Other (No AVH)


Delusions:  Yes


Delusion Type:  Paranoid


Suicidal:  Ideation (Ongoing SI)


Homicidal:  Ideation (No HI)


Insight/Judgement


poor


Vitals/IOs





 Vital Signs








  Date Time  Temp Pulse Resp B/P Pulse Ox O2 Delivery O2 Flow Rate FiO2


 


11/27/16 19:31 98.1 80 17 137/57 97   








 Intake and Output








 11/26/16 11/26/16 11/27/16





 08:00 16:00 00:00


 


Intake Total 980 ml 720 ml 360 ml


 


Balance 980 ml 720 ml 360 ml











Problem List:  


(1) [place] disease


ICD Code:  G10


(2) Dementia


ICD Code:  F03.90


Assessment & Plan


Continue current tx plan. Estimated LOS:  days


Request HC Surrog/Guard Advoc?:  Yes





Problem Qualifiers





(1) Dementia:  


Qualified Code:  F02.81 - Dementia associated with other underlying disease 

with behavioral disturbance





Clarice Torres MD Nov 27, 2016 19:38

## 2016-11-28 NOTE — HHI.PYPN
Subjective


Remarks


Patient check to my care on unit 2500, patient discussed with treatment team 

today chart review today patient seen on unit.  Patient quite angry and 

paranoid after anxious myself she stormed off saying I don't need a doctor.  

Will have counselor talk with patient's family about possible use of Zyprexa by 

mouth or IM





Review of Systems


Other


No somatic complaints noted today





Objective


Alert:  Yes


[place]:  Person, Place


Mood:  Anxious


Affect:  Labile


Memory Intact:  Comment (mild impairment)


Hallucinations:  Other (No AVH)


Delusions:  Yes


Delusion Type:  Paranoid


Suicidal:  Ideation (Ongoing SI)


Homicidal:  Ideation (No HI)


Insight/Judgement


Very poor


Vitals/IOs





 Vital Signs








  Date Time  Temp Pulse Resp B/P Pulse Ox O2 Delivery O2 Flow Rate FiO2


 


11/27/16 19:31 98.1 80 17 137/57 97   








 Intake and Output








 11/27/16 11/27/16 11/27/16





 07:59 15:59 23:59


 


Intake Total 0 ml 1440 ml 480 ml


 


Balance 0 ml 1440 ml 480 ml











Assessment & Plan


Problem List:  


(1) [place] disease


ICD Code:  G10


(2) Dementia


ICD Code:  F03.90


Assessment & Plan


Estimated LOS:  days patient continues quite paranoid and confused.  Will 

attempt to get permission from family to initiate Zyprexa


Discharge Planning


To be determined


Request HC Surrog/Guard Advoc?:  Yes





Problem Qualifiers





(1) Dementia:  


Qualified Code:  F02.81 - Dementia associated with other underlying disease 

with behavioral disturbance





Gage Bob MD Nov 28, 2016 15:06

## 2016-11-29 NOTE — HHI.PYPN
Subjective


Remarks


Attempted to speak with patient in dayroom today she continues angry paranoid 

refusing to speak with me.  Patient remains [place] noncompliant with 

medications though it appears she did take her Zyprexa at at bedtime last 

night.  For now continue treatment





Review of Systems


Other


No somatic complaints today





Objective


Alert:  Yes


[place]:  Person, Place


Mood:  Anxious


Affect:  Labile


Memory Intact:  Comment (mild impairment)


Hallucinations:  Other (No AVH)


Delusions:  Yes


Delusion Type:  Paranoid


Suicidal:  Ideation (Ongoing SI)


Homicidal:  Ideation (No HI)


Insight/Judgement


Very poor


Vitals/IOs





 Vital Signs








  Date Time  Temp Pulse Resp B/P Pulse Ox O2 Delivery O2 Flow Rate FiO2


 


11/29/16 06:37 98.0 60 18 100/65 99   








 Intake and Output








 11/28/16 11/28/16 11/28/16





 07:59 15:59 23:59


 


Intake Total 240 ml 360 ml 580 ml


 


Balance 240 ml 360 ml 580 ml











Assessment & Plan


Problem List:  


(1) [place] disease


ICD Code:  G10


(2) Dementia


ICD Code:  F03.90


Assessment & Plan


Patient continues somewhat confused, psychotic, paranoid, angry and irritable 

Estimated LOS:  days


Discharge Planning


To be determined


Request HC Surrog/Guard Advoc?:  Yes





Problem Qualifiers





(1) Dementia:  


Qualified Code:  F02.81 - Dementia associated with other underlying disease 

with behavioral disturbance





Gage Bob MD Nov 29, 2016 10:04

## 2016-11-30 NOTE — HHI.PYPN
Subjective


Remarks


Patient was seen and reevaluated today in the psychiatric pandya at bedside, she 

was calm and cooperative, choreiform movement and restlessness observed, she 

endorses good mood, says that she is not upset for the hospitalization, she is 

mostly upset with her kids because they don't believe her.  Patient is oriented 

times person and place.  She denies suicidal or homicidal ideation.





Review of Systems


Other


No somatic complaints this moment





Objective


Alert:  Yes


[place]:  Person, Place, Date


Mood:  Calm


Affect:  Euthymic


Memory Intact:  Comment (mild impairment)


Hallucinations:  Other (No AVH)


Delusions:  Yes


Delusion Type:  Other


Suicidal:  Ideation (she denies suicidal ideation)


Homicidal:  Ideation (No HI)


Insight/Judgement


fair


Vitals/IOs





 Vital Signs








  Date Time  Temp Pulse Resp B/P Pulse Ox O2 Delivery O2 Flow Rate FiO2


 


11/29/16 20:03 97.8 75 18 114/72 97   








 Intake and Output








 11/29/16 11/29/16 11/30/16





 08:00 16:00 00:00


 


Intake Total 0 ml 600 ml 240 ml


 


Balance 0 ml 600 ml 240 ml











Assessment & Plan


Problem List:  


(1) [place] disease


ICD Code:  G10


(2) Dementia


Assessment & Plan:  On reevaluation today the patient does not present any 

visible depression, anxiety, devonte or perceptual disturbances.  She denies 

suicidal or homicidal ideation.  Patient will continue process of psychiatry 

hospitalization in order to continue coordinating a safe discharge.  No changes 

in psychotropics.


ICD Code:  F03.90


Assessment & Plan


Estimated LOS:  days


Request HC Surrog/Guard Advoc?:  Yes





Problem Qualifiers





(1) Dementia:  


Qualified Code:  F02.81 - Dementia associated with other underlying disease 

with behavioral disturbance





Jorge Taylor MD Nov 30, 2016 14:17

## 2016-12-01 NOTE — HHI.PYPN
Subjective


Remarks


Patient seen in [place] [address] with her son and daughter, patient retained by 

 Y[facility] with daughter to be guardian advocate.  Patient continues 

somewhat labile though did present well and court.  She continues to deny a 

need for physician offer medication.  For now continue treatment with 

increasing the at bedtime Zyprexa to 10 mg





Review of Systems


Other


No somatic complaints today





Objective


Alert:  Yes


[place]:  Person, Place, Date


Mood:  Calm


Affect:  Euthymic


Memory Intact:  Comment (mild impairment)


Hallucinations:  Other (No AVH)


Delusions:  Yes


Delusion Type:  Other


Suicidal:  Ideation (she denies suicidal ideation)


Homicidal:  Ideation (No HI)


Insight/Judgement


Very poor


Vitals/IOs





 Vital Signs








  Date Time  Temp Pulse Resp B/P Pulse Ox O2 Delivery O2 Flow Rate FiO2


 


12/1/16 06:00 97.6 60 16 98/58 99   








 Intake and Output








 11/30/16 11/30/16 12/1/16





 08:00 16:00 00:00


 


Intake Total 600 ml 480 ml 360 ml


 


Balance 600 ml 480 ml 360 ml











Assessment & Plan


Problem List:  


(1) [place] disease


ICD Code:  G10


(2) Dementia


ICD Code:  F03.90


Assessment & Plan


Estimated LOS:  days patient continues irritable somewhat confused labile was 

retained by  Yandel she medication adjustments about


Discharge Planning


To be determined


Request HC Surrog/Guard Advoc?:  Yes





Problem Qualifiers





(1) Dementia:  


Qualified Code:  F02.81 - Dementia associated with other underlying disease 

with behavioral disturbance





Gage Bob MD Dec 1, 2016 10:00

## 2016-12-02 NOTE — HHI.PYPN
Subjective


Remarks


Patient seen in her room patient in bed.  Nurse Gallito present also.  Patient 

somewhat calmer today somewhat better eye contact, she was compliant with the 

medication at bedtime last night.  She also stated her children are attempting 

to find an detention for her which appeared to be responding to fairly positively.  

For now continue treatment





Review of Systems


Other


No somatic complaints today





Objective


Alert:  Yes


[place]:  Person, Place, Date


Mood:  Calm


Affect:  Euthymic


Memory Intact:  Comment (mild impairment)


Hallucinations:  Other (No AVH)


Delusions:  Yes


Delusion Type:  Other


Suicidal:  Ideation (she denies suicidal ideation)


Homicidal:  Ideation (No HI)


Insight/Judgement


Poor


Vitals/IOs





 Vital Signs








  Date Time  Temp Pulse Resp B/P Pulse Ox O2 Delivery O2 Flow Rate FiO2


 


12/2/16 06:42 98.6 58 16 103/51 95   








 Intake and Output








 12/1/16 12/1/16 12/2/16





 08:00 16:00 00:00


 


Intake Total  1500 ml 360 ml


 


Balance  1500 ml 360 ml











Assessment & Plan


Problem List:  


(1) [place] disease


ICD Code:  G10


(2) Dementia


ICD Code:  F03.90


Assessment & Plan


Estimated LOS:  days patient continues somewhat psychotic isolating but did 

show compliance with medication yesterday evening.  For now continue treatment


Discharge Planning


To be determined


Request HC Surrog/Guard Advoc?:  Yes





Problem Qualifiers





(1) Dementia:  


Qualified Code:  F02.81 - Dementia associated with other underlying disease 

with behavioral disturbance





Gage Bob MD Dec 2, 2016 10:32

## 2016-12-03 NOTE — HHI.PYPN
Subjective


Remarks


Pt seen and discussed with staff.  Pt has been calm and in better behavioral 

control. No agitation today. Compliant with treatment. Pt reports mood is good 

and she denies medication side effects. No SI/HI.





Objective


Alert:  Yes


[place]:  Person, Place, Date


Mood:  Calm


Affect:  Euthymic


Memory Intact:  Comment (mild impairment)


Hallucinations:  Other (No AVH)


Delusions:  No


Delusion Type:  Other


Suicidal:  Ideation (she denies suicidal ideation)


Homicidal:  Ideation (No HI)


Insight/Judgement


Continue current tx plan.


Vitals/IOs





 Vital Signs








  Date Time  Temp Pulse Resp B/P Pulse Ox O2 Delivery O2 Flow Rate FiO2


 


12/3/16 06:07 97.3 67 18 106/70 95   








 Intake and Output








 12/2/16 12/2/16 12/3/16





 08:00 16:00 00:00


 


Intake Total 360 ml 1080 ml 720 ml


 


Balance 360 ml 1080 ml 720 ml











Assessment & Plan


Problem List:  


(1) [place] disease


ICD Code:  G10


(2) Dementia


ICD Code:  F03.90


Assessment & Plan


Continue current tx plan. Estimated LOS:  days


Request HC Surrog/Guard Advoc?:  Yes





Problem Qualifiers





(1) Dementia:  


Qualified Code:  F02.81 - Dementia associated with other underlying disease 

with behavioral disturbance





Clarice Torres MD Dec 3, 2016 15:56

## 2016-12-04 NOTE — HHI.PYPN
Subjective


Remarks


Pt seen and discussed with staff. RN reports that pt attempted to cheek 

nighttime olanzapine. Pt is irritable today. Hygiene is poor and pt states that 

she will not bathe anymore.  Staff report more labile in mood but no 

aggression. No SI/HI.





Objective


Alert:  Yes


[place]:  Person, Place, Date


Mood:  Other (irritable)


Affect:  Labile


Memory Intact:  Comment (mild impairment)


Hallucinations:  Other (No AVH)


Delusions:  No


Delusion Type:  Other


Suicidal:  Ideation (she denies suicidal ideation)


Homicidal:  Ideation (No HI)


Insight/Judgement


poor


Vitals/IOs





 Vital Signs








  Date Time  Temp Pulse Resp B/P Pulse Ox O2 Delivery O2 Flow Rate FiO2


 


12/4/16 06:00 98.4 86 16 103/63 97   








 Intake and Output








 12/3/16 12/3/16 12/4/16





 08:00 16:00 00:00


 


Intake Total  720 ml 100 ml


 


Balance  720 ml 100 ml











Assessment & Plan


Problem List:  


(1) [place] disease


ICD Code:  G10


(2) Dementia


ICD Code:  F03.90


Assessment & Plan


Continue current tx plan. Estimated LOS:  days


Request HC Surrog/Guard Advoc?:  Yes





Problem Qualifiers





(1) Dementia:  


Qualified Code:  F02.81 - Dementia associated with other underlying disease 

with behavioral disturbance





Clarice Torres MD Dec 4, 2016 12:25

## 2016-12-05 NOTE — HHI.PYPN
Subjective


Remarks


Patient discussed with treatment team, after treatment team met with patient's 

son and daughter, patient seen on unit, patient continues somewhat irritable 

but calmer and compliant with medications.  Family feels she is also improving 

somewhat, patient continues to be willing to consider care home placement continue 

treatment





Review of Systems


Other


No somatic complaints today





Objective


Alert:  Yes


[place]:  Person, Place, Date


Mood:  Other (irritable)


Affect:  Labile


Memory Intact:  Comment (mild impairment)


Hallucinations:  Other (No AVH)


Delusions:  No


Delusion Type:  Other


Suicidal:  Ideation (she denies suicidal ideation)


Homicidal:  Ideation (No HI)


Insight/Judgement


Poor


Vitals/IOs





 Vital Signs








  Date Time  Temp Pulse Resp B/P Pulse Ox O2 Delivery O2 Flow Rate FiO2


 


12/4/16 20:33 98.4 82 18 108/69 98   








 Intake and Output








 12/4/16 12/4/16 12/4/16





 07:59 15:59 23:59


 


Intake Total  1440 ml 960 ml


 


Balance  1440 ml 960 ml











Assessment & Plan


Problem List:  


(1) [place] disease


ICD Code:  G10


(2) Dementia


ICD Code:  F03.90


Assessment & Plan


Estimated LOS:  days patient continues somewhat irritable labile, though 

somewhat softer, compliant medications.  For now continue treatment


Discharge Planning


To Be determined


Request HC Surrog/Guard Advoc?:  Yes





Problem Qualifiers





(1) Dementia:  


Qualified Code:  F02.81 - Dementia associated with other underlying disease 

with behavioral disturbance





Gage Bob MD Dec 5, 2016 15:48

## 2016-12-06 NOTE — HHI.PYPN
Subjective


Remarks


Patient seen in day room with nurse Breanna, patient calmer more appropriate 

with me today the paranoia vigilance and anger have diminished.  She is 

accepting of the fact that her children are looking for an [facility] for her, she is 

willing to go there willing to go there, patient compliant medications.  For 

now continue treatment





Review of Systems


Other


No somatic complaints today





Objective


Alert:  Yes


[place]:  Person, Place, Date


Mood:  Calm, Other (marked decrease in irritability)


Affect:  Labile (markedly decreased)


Memory Intact:  Comment (mild impairment)


Hallucinations:  Other (No AVH)


Delusions:  No


Delusion Type:  Other


Suicidal:  Ideation (she denies suicidal ideation)


Homicidal:  Ideation (No HI)


Insight/Judgement


Poor


Vitals/IOs





 Vital Signs








  Date Time  Temp Pulse Resp B/P Pulse Ox O2 Delivery O2 Flow Rate FiO2


 


12/6/16 06:22 97.9 60 18 108/60 97   








 Intake and Output








 12/5/16 12/5/16 12/6/16





 08:00 16:00 00:00


 


Intake Total 240 ml 720 ml 930 ml


 


Balance 240 ml 720 ml 930 ml











Assessment & Plan


Problem List:  


(1) [place] disease


ICD Code:  G10


(2) Dementia


ICD Code:  F03.90


Assessment & Plan


Estimated LOS:  days patient calmer more appropriate focused today, compliant 

medications.  For now continue treatment


Discharge Planning


To be determined


Request HC Surrog/Guard Advoc?:  Yes





Problem Qualifiers





(1) Dementia:  


Qualified Code:  F02.81 - Dementia associated with other underlying disease 

with behavioral disturbance





Gage Bob MD Dec 6, 2016 08:28

## 2016-12-07 NOTE — HHI.PYPN
Subjective


Remarks


Patient seen in dayroom with Joseph, patient continues calm pleasant with me 

compliant medications continues to be in agreement with placement in retirement/

nursing home





Review of Systems


Other


No somatic complaints today





Objective


Alert:  Yes


[place]:  Person, Place, Date


Mood:  Calm, Other (marked decrease in irritability)


Affect:  Labile (markedly decreased)


Memory Intact:  Comment (mild impairment)


Hallucinations:  Other (No AVH)


Delusions:  No


Delusion Type:  Other


Suicidal:  Ideation (she denies suicidal ideation)


Homicidal:  Ideation (No HI)


Insight/Judgement


Poor


Vitals/IOs





 Vital Signs








  Date Time  Temp Pulse Resp B/P Pulse Ox O2 Delivery O2 Flow Rate FiO2


 


12/6/16 19:14 99.2 75 18 119/59 96   








 Intake and Output








 12/6/16 12/6/16 12/7/16





 08:00 16:00 00:00


 


Intake Total  960 ml 600 ml


 


Balance  960 ml 600 ml











Assessment & Plan


Problem List:  


(1) [place] disease


ICD Code:  G10


(2) Dementia


ICD Code:  F03.90


Assessment & Plan


Estimated LOS:  days patient continue somewhat confused though calmer more 

compliant with medications.  Now continue treatment


Discharge Planning


To be determined


Request HC Surrog/Guard Advoc?:  Yes





Problem Qualifiers





(1) Dementia:  


Qualified Code:  F02.81 - Dementia associated with other underlying disease 

with behavioral disturbance





Gage Bob MD Dec 7, 2016 10:33

## 2016-12-08 NOTE — HHI.PYPN
Subjective


Remarks


Patient seen in dayroom with floor staff, patient continues calm pleasant with 

me the marked decrease in the paranoia anger and lability.  Compliant 

medications.  For now continue treatment





Review of Systems


Other


No somatic complaints today





Objective


Alert:  Yes


[place]:  Person, Place, Date


Mood:  Calm, Other (marked decrease in irritability)


Affect:  Labile (markedly decreased)


Memory Intact:  Comment (mild impairment)


Hallucinations:  Other (No AVH)


Delusions:  No


Delusion Type:  Other


Suicidal:  Ideation (she denies suicidal ideation)


Homicidal:  Ideation (No HI)


Insight/Judgement


Poor


Vitals/IOs





 Vital Signs








  Date Time  Temp Pulse Resp B/P Pulse Ox O2 Delivery O2 Flow Rate FiO2


 


12/8/16 06:52 98.0 60 18 106/69 97   








 Intake and Output








 12/7/16 12/7/16 12/7/16





 07:59 15:59 23:59


 


Intake Total  1200 ml 720 ml


 


Balance  1200 ml 720 ml











Assessment & Plan


Problem List:  


(1) [place] disease


ICD Code:  G10


(2) Dementia


ICD Code:  F03.90


Assessment & Plan


Estimated LOS:  days patient continue somewhat confused but calmer with less 

behavioral outbursts.  However at this time the patient will decompensate at a 

lower level of care or less restrictive setting


Discharge Planning


To be determined


Request HC Surrog/Guard Advoc?:  Yes





Problem Qualifiers





(1) Dementia:  


Qualified Code:  F02.81 - Dementia associated with other underlying disease 

with behavioral disturbance





Gage Bob MD Dec 8, 2016 09:07

## 2016-12-10 NOTE — HHI.PYPN
Subjective


Remarks


Pt seen and discussed with staff. No agitation or disruptive behavior. Pt is 

compliant with care. She is isolative and stays in her room except for meals. 

No SI/HI.





Objective


Alert:  Yes


[place]:  Person, Place, Date


Mood:  Calm, Other (marked decrease in irritability)


Affect:  Restricted


Memory Intact:  Comment (mild impairment)


Hallucinations:  Other (No AVH)


Delusions:  No


Delusion Type:  Other (none)


Suicidal:  Ideation (she denies suicidal ideation)


Homicidal:  Ideation (No HI)


Insight/Judgement


limited


Vitals/IOs





 Vital Signs








  Date Time  Temp Pulse Resp B/P Pulse Ox O2 Delivery O2 Flow Rate FiO2


 


12/10/16 05:54 98.1 61 16 98/56 97   








 Intake and Output








 12/9/16 12/9/16 12/9/16





 07:59 15:59 23:59


 


Intake Total 0 ml 600 ml 840 ml


 


Balance 0 ml 600 ml 840 ml











Assessment & Plan


Problem List:  


(1) [place] disease


ICD Code:  G10


(2) Dementia


ICD Code:  F03.90


Assessment & Plan


Continue current tx plan. Estimated LOS:  days


Request HC Surrog/Guard Advoc?:  Yes





Problem Qualifiers





(1) Dementia:  


Qualified Code:  F02.81 - Dementia associated with other underlying disease 

with behavioral disturbance





Clarice Torres MD Dec 10, 2016 15:51

## 2016-12-11 NOTE — HHI.PYPN
Subjective


Remarks


Pt seen and discussed with staff. She has been more irritable and seclusive 

today. Compliant with medications. No SI/HI.





Objective


Alert:  Yes


[place]:  Person, Place, Date


Mood:  Calm, Other (marked decrease in irritability)


Affect:  Restricted


Memory Intact:  Comment (mild impairment)


Hallucinations:  Other (No AVH)


Delusions:  No


Delusion Type:  Other (none)


Suicidal:  Ideation (she denies suicidal ideation)


Homicidal:  Ideation (No HI)


Insight/Judgement


poor


Vitals/IOs





 Vital Signs








  Date Time  Temp Pulse Resp B/P Pulse Ox O2 Delivery O2 Flow Rate FiO2


 


12/11/16 19:19 98.9 79 16 113/73 100   








 Intake and Output








 12/10/16 12/10/16 12/10/16





 07:59 15:59 23:59


 


Intake Total 0 ml 1440 ml 600 ml


 


Balance 0 ml 1440 ml 600 ml











Assessment & Plan


Problem List:  


(1) [place] disease


ICD Code:  G10


(2) Dementia


ICD Code:  F03.90


Assessment & Plan


Continue current tx plan. Estimated LOS:  days


Request HC Surrog/Guard Advoc?:  Yes





Problem Qualifiers





(1) Dementia:  


Qualified Code:  F02.81 - Dementia associated with other underlying disease 

with behavioral disturbance





Clarice Torres MD Dec 11, 2016 19:54

## 2016-12-12 NOTE — HHI.PYPN
Subjective


Remarks


Patient discussed with treatment team, later seen on unit, continues calm with 

me though also continues somewhat confused chart review, patient continues to 

be receptive to prison placement though she continues also angry with her children 

for the decision to do this.  Patient compliant with medications, no 

significant behavioral problems for now continue treatment





Review of Systems


Other


No somatic complaints today





Objective


Alert:  Yes


[place]:  Person, Place, Date


Mood:  Calm, Other (marked decrease in irritability)


Affect:  Restricted


Memory Intact:  Comment (mild impairment)


Hallucinations:  Other (No AVH)


Delusions:  No


Delusion Type:  Other (none)


Suicidal:  Ideation (she denies suicidal ideation)


Homicidal:  Ideation (No HI)


Insight/Judgement


Poor


Vitals/IOs





 Vital Signs








  Date Time  Temp Pulse Resp B/P Pulse Ox O2 Delivery O2 Flow Rate FiO2


 


12/12/16 05:54 97.7 55 16 94/61 97   








 Intake and Output








 12/11/16 12/11/16 12/12/16





 08:00 16:00 00:00


 


Intake Total 240 ml 720 ml 960 ml


 


Balance 240 ml 720 ml 960 ml











Assessment & Plan


Problem List:  


(1) [place] disease


ICD Code:  G10


(2) Dementia


ICD Code:  F03.90


Assessment & Plan


Estimated LOS:  days patient continue somewhat confused and disoriented, though 

compliant medications, no significant behavioral problems.


Justification for Cont. Inpt.


At this time patient with severely decompensated a lower level of care a less 

restrictive setting


Discharge Planning


To be determined


Request HC Surrog/Guard Advoc?:  Yes





Problem Qualifiers





(1) Dementia:  


Qualified Code:  F02.81 - Dementia associated with other underlying disease 

with behavioral disturbance





Gage Bob MD Dec 12, 2016 15:05

## 2016-12-13 NOTE — HHI.PYPN
Subjective


Remarks


Patient seen in her room with nurse Calli, chart review, patient calm cooperative 

with me with good eye contact, compliant medications, continues to be willing 

and except [facility] placement.  She does continue though to be angry at her children 

for what she perceives as there forcing her into this placement





Review of Systems


Other


No somatic complaints today





Objective


Alert:  Yes


[place]:  Person, Place, Date


Mood:  Calm, Other (marked decrease in irritability)


Affect:  Restricted


Memory Intact:  Comment (mild impairment)


Hallucinations:  Other (No AVH)


Delusions:  No


Delusion Type:  Other (none)


Suicidal:  Ideation (she denies suicidal ideation)


Homicidal:  Ideation (No HI)


Insight/Judgement


Poor


Vitals/IOs





 Vital Signs








  Date Time  Temp Pulse Resp B/P Pulse Ox O2 Delivery O2 Flow Rate FiO2


 


12/13/16 05:14 97.8 62 16 99/62 96   








 Intake and Output








 12/12/16 12/12/16 12/13/16





 08:00 16:00 00:00


 


Intake Total 0 ml 720 ml 960 ml


 


Balance 0 ml 720 ml 960 ml











Assessment & Plan


Problem List:  


(1) [place] disease


ICD Code:  G10


(2) Dementia


ICD Code:  F03.90


Assessment & Plan


Estimated LOS:  days patient continue somewhat confused, angry with her children

, though compliant with medications, for now continue treatment


Justification for Cont. Inpt.


Patient would severely decompensate at a lower level of care less restrictive 

setting


Discharge Planning


To be determined


Request HC Surrog/Guard Advoc?:  Yes





Problem Qualifiers





(1) Dementia:  


Qualified Code:  F02.81 - Dementia associated with other underlying disease 

with behavioral disturbance





Gage Bob MD Dec 13, 2016 09:19

## 2016-12-14 NOTE — HHI.PYPN
Subjective


Remarks


Patient seen in room today with nurse Rachael and family practice resident Kevin, 

today patient more angry paranoid irritable now refusing to go to [facility] stating 

she was noted apartment, or "I'll die here first" will increase at bedtime 

Zyprexa to 15 mg





Review of Systems


Other


No somatic complaints today





Objective


Alert:  Yes


[place]:  Person, Place, Date


Mood:  Calm, Other (marked decrease in irritability)


Affect:  Restricted


Memory Intact:  Comment (mild impairment)


Hallucinations:  Other (No AVH)


Delusions:  No


Delusion Type:  Other (none)


Suicidal:  Ideation (she denies suicidal ideation)


Homicidal:  Ideation (No HI)


Insight/Judgement


Very poor


Vitals/IOs





 Vital Signs








  Date Time  Temp Pulse Resp B/P Pulse Ox O2 Delivery O2 Flow Rate FiO2


 


12/13/16 20:18 99.2 90 17 124/68 97   








 Intake and Output








 12/13/16 12/13/16 12/14/16





 08:00 16:00 00:00


 


Intake Total 360 ml  240 ml


 


Balance 360 ml  240 ml











Assessment & Plan


Problem List:  


(1) [place] disease


ICD Code:  G10


(2) Dementia


ICD Code:  F03.90


Assessment & Plan


Estimated LOS:  days patient showing increased signs of paranoia irritability, 

she medication changes above


Justification for Cont. Inpt.


At this time the patient with severely decompensate at a lower level of care 

her less restrictive setting


Discharge Planning


To be determined


Request HC Surrog/Guard Advoc?:  Yes





Problem Qualifiers





(1) Dementia:  


Qualified Code:  F02.81 - Dementia associated with other underlying disease 

with behavioral disturbance





Gage Bob MD Dec 14, 2016 10:37

## 2016-12-15 NOTE — HHI.PYPN
Subjective


Remarks


Patient seen in her room with floor staff,.  Continues with ambivalence related 

to placement shelter versus a motel or apartment.  He also continues marked anger 

with her children related to their recommendations.  Patient compliant 

medications





Review of Systems


Other


No somatic complaints today





Objective


Alert:  Yes


[place]:  Person, Place, Date


Mood:  Calm, Other (marked decrease in irritability)


Affect:  Restricted


Memory Intact:  Comment (mild impairment)


Hallucinations:  Other (No AVH)


Delusions:  No


Delusion Type:  Other (none)


Suicidal:  Ideation (she denies suicidal ideation)


Homicidal:  Ideation (No HI)


Insight/Judgement


Poor


Vitals/IOs





 Vital Signs








  Date Time  Temp Pulse Resp B/P Pulse Ox O2 Delivery O2 Flow Rate FiO2


 


12/15/16 05:58 97.3 54 16 96/66 97   








 Intake and Output








 12/14/16 12/14/16 12/15/16





 08:00 16:00 00:00


 


Intake Total 0 ml 1080 ml 1680 ml


 


Balance 0 ml 1080 ml 1680 ml











Assessment & Plan


Problem List:  


(1) [place] disease


ICD Code:  G10


(2) Dementia


ICD Code:  F03.90


Assessment & Plan


Estimated LOS:  days patient remains somewhat paranoid, delusional, and angry 

with poor resistance to appropriate placement


Justification for Cont. Inpt.


At this time the patient was really decompensated lower level of care or less 

restrictive setting


Discharge Planning


To be determined


Request HC Surrog/Guard Advoc?:  Yes





Problem Qualifiers





(1) Dementia:  


Qualified Code:  F02.81 - Dementia associated with other underlying disease 

with behavioral disturbance





Gage Bob MD Dec 15, 2016 10:24

## 2016-12-16 NOTE — HHI.PYPN
Subjective


Remarks


Patient seen in day room with floor staff, continues angry with her children 

refusing to speak with them, she continues to show ambivalence now related to 

placement [facility] versus oral place.  Patient compliant medications





Review of Systems


Other


No somatic complaints today





Objective


Alert:  Yes


[place]:  Person, Place, Date


Mood:  Calm, Other (marked decrease in irritability)


Affect:  Restricted


Memory Intact:  Comment (mild impairment)


Hallucinations:  Other (No AVH)


Delusions:  No


Delusion Type:  Other (none)


Suicidal:  Ideation (she denies suicidal ideation)


Homicidal:  Ideation (No HI)


Insight/Judgement


Very poor


Vitals/IOs





 Vital Signs








  Date Time  Temp Pulse Resp B/P Pulse Ox O2 Delivery O2 Flow Rate FiO2


 


12/15/16 18:52 98.2 71 17 119/48    


 


12/15/16 05:58     97   








 Intake and Output








 12/15/16 12/15/16 12/16/16





 08:00 16:00 00:00


 


Intake Total 0 ml 480 ml 360 ml


 


Balance 0 ml 480 ml 360 ml











Assessment & Plan


Problem List:  


(1) [place] disease


ICD Code:  G10


(2) Dementia


ICD Code:  F03.90


Assessment & Plan


Estimated LOS:  days patient remains angry vigilant primarily focused towards 

children, continues ambivalent related to placement issues


Justification for Cont. Inpt.


At this time patient was severely decompensated lower level of care or less 

restrictive setting


Discharge Planning


To be determined


Request HC Surrog/Guard Advoc?:  Yes





Problem Qualifiers





(1) Dementia:  


Qualified Code:  F02.81 - Dementia associated with other underlying disease 

with behavioral disturbance





Gage Bob MD Dec 16, 2016 11:44

## 2016-12-18 NOTE — HHI.PYPN
Subjective


Remarks


Patient was seen and case discussed with nursing.  Patient continues to have 

poor insight into her admission.  "Nothing new.  Looking for place."  Patient 

is irritable that her roommate is attention seeking.  Compliant with 

medications and denies suicidal ideations thought or plan





Objective


Alert:  Yes


[place]:  Person, Place, Date


Mood:  Calm, Other (marked decrease in irritability)


Affect:  Restricted


Memory Intact:  Comment (mild impairment)


Hallucinations:  Other (No AVH)


Delusions:  No


Delusion Type:  Other (none)


Suicidal:  Ideation (she denies suicidal ideation)


Homicidal:  Ideation (No HI)


Insight/Judgement


Poor


Vitals/IOs





 Vital Signs








  Date Time  Temp Pulse Resp B/P Pulse Ox O2 Delivery O2 Flow Rate FiO2


 


12/18/16 06:22 97.0 54 16 100/63 94   








 Intake and Output








 12/17/16 12/17/16 12/17/16





 07:59 15:59 23:59


 


Intake Total  1680 ml 480 ml


 


Balance  1680 ml 480 ml











Assessment & Plan


Problem List:  


(1) [place] disease


ICD Code:  G10


(2) Dementia


ICD Code:  F03.90


Assessment & Plan


Continue current treatment plan


Justification for Cont. Inpt.


Patient would decompensate in a less restrictive setting


Request HC Surrog/Guard Advoc?:  Yes





Problem Qualifiers





(1) Dementia:  


Qualified Code:  F02.81 - Dementia associated with other underlying disease 

with behavioral disturbance





Cayetano Carolina DO Dec 18, 2016 12:51

## 2016-12-19 NOTE — HHI.PYPN
Subjective


Remarks


Patient discussed with treatment team, chart reviewed, patient seen on unit.  

Patient continues to be somewhat irritable with resistance and reluctance to 

accept placement recommendations of [facility] stating she would rather live in a 

motel or live here forever.  Patient showing little insight into this, patient 

is compliant with medications will increase at bedtime Zyprexa to 20 mg





Review of Systems


Other


No somatic complaints today





Objective


Alert:  Yes


[place]:  Person, Place, Date


Mood:  Calm, Other (marked decrease in irritability)


Affect:  Restricted


Memory Intact:  Comment (mild impairment)


Hallucinations:  Other (No AVH)


Delusions:  No


Delusion Type:  Other (none)


Suicidal:  Ideation (she denies suicidal ideation)


Homicidal:  Ideation (No HI)


Insight/Judgement


Poor


Vitals/IOs





 Vital Signs








  Date Time  Temp Pulse Resp B/P Pulse Ox O2 Delivery O2 Flow Rate FiO2


 


12/18/16 17:39 98.5 80 16 104/71 97   








 Intake and Output








 12/18/16 12/18/16 12/19/16





 08:00 16:00 00:00


 


Intake Total  240 ml 360 ml


 


Balance  240 ml 360 ml











Assessment & Plan


Problem List:  


(1) [place] disease


ICD Code:  G10


(2) Dementia


ICD Code:  F03.90


Assessment & Plan


Estimated LOS:  days patient continues confused irritable with issues related 

to placement, continues markedly angry with children related to that


Justification for Cont. Inpt.


At this time the patient will decompensate at a lower level of care


Discharge Planning


To be determined


Request HC Surrog/Guard Advoc?:  Yes





Problem Qualifiers





(1) Dementia:  


Qualified Code:  F02.81 - Dementia associated with other underlying disease 

with behavioral disturbance





Gage Bob MD Dec 19, 2016 13:04

## 2016-12-20 NOTE — HHI.PYPN
Subjective


Remarks


Patient seen in her room with floor staff, patient laying in bed, though she 

was up for lunch today and did some brief socializing.  Patient is somewhat 

softer in her irritability and anger related to placement no appears to be 

coming to the realization of an [facility] placement, though she still wonders if an 

apartment or motel could be an alternative.  I declined that option.  Patient 

compliant with medication, does denies suicidality homicidality





Review of Systems


Other


No somatic complaints today





Objective


Alert:  Yes


[place]:  Person, Place, Date


Mood:  Calm, Other (marked decrease in irritability)


Affect:  Restricted


Memory Intact:  Comment (mild impairment)


Hallucinations:  Other (No AVH)


Delusions:  No


Delusion Type:  Other (none)


Suicidal:  Ideation (she denies suicidal ideation)


Homicidal:  Ideation (No HI)


Insight/Judgement


Poor


Vitals/IOs





 Vital Signs








  Date Time  Temp Pulse Resp B/P Pulse Ox O2 Delivery O2 Flow Rate FiO2


 


12/20/16 05:56 97.7 58 16 97/56 96   








 Intake and Output








 12/19/16 12/19/16 12/19/16





 07:59 15:59 23:59


 


Intake Total 0 ml 720 ml 1260 ml


 


Balance 0 ml 720 ml 1260 ml











Assessment & Plan


Problem List:  


(1) [place] disease


ICD Code:  G10


(2) Dementia


ICD Code:  F03.90


Assessment & Plan


Estimated LOS:  days patient continues confused and demented, though her 

paranoia and resistance to placement is softening


Justification for Cont. Inpt.


At this time the patient would severely decompensate at a lower level of care


Discharge Planning


To be determined


Request HC Surrog/Guard Advoc?:  Yes





Problem Qualifiers





(1) Dementia:  


Qualified Code:  F02.81 - Dementia associated with other underlying disease 

with behavioral disturbance





Gage Bob MD Dec 20, 2016 13:10

## 2016-12-21 NOTE — HHI.PYPN
Subjective


Remarks


Patient seen in her room with floor staff, patient calm today though with a 

decrease in her eye contact, patient continues to show reluctant agreement to 

[facility] placement, though she continues to ask about an apartment or hotel.  

Patient compliant with medications





Review of Systems


Other


No somatic complaints today





Objective


Alert:  Yes


[place]:  Person, Place, Date


Mood:  Calm, Other (marked decrease in irritability)


Affect:  Restricted


Memory Intact:  Comment (mild impairment)


Hallucinations:  Other (No AVH)


Delusions:  No


Delusion Type:  Other (none)


Suicidal:  Ideation (she denies suicidal ideation)


Homicidal:  Ideation (No HI)


Insight/Judgement


Poor


Vitals/IOs





 Vital Signs








  Date Time  Temp Pulse Resp B/P Pulse Ox O2 Delivery O2 Flow Rate FiO2


 


12/20/16 19:48 98.9 73 16 100/63    


 


12/20/16 05:56     96   








 Intake and Output








 12/20/16 12/20/16 12/21/16





 08:00 16:00 00:00


 


Intake Total 0 ml 1200 ml 600 ml


 


Balance 0 ml 1200 ml 600 ml











Assessment & Plan


Problem List:  


(1) [place] disease


ICD Code:  G10


(2) Dementia


ICD Code:  F03.90


Assessment & Plan


Estimated LOS:  days if remains confused disoriented, with some reluctance to 

accept placement though compliant medications


Justification for Cont. Inpt.


At this time patient would severely decompensated a lower level of care


Discharge Planning


To be determined


Request HC Surrog/Guard Advoc?:  Yes





Problem Qualifiers





(1) Dementia:  


Qualified Code:  F02.81 - Dementia associated with other underlying disease 

with behavioral disturbance





Gage Bob MD Dec 21, 2016 14:06

## 2016-12-22 NOTE — HHI.PYPN
Subjective


Remarks


Patient seen in her room with nurse Claudette, chart review, patient continues 

to isolate, though no behavioral problems, is up to eat, minimum socializing 

with other patient's or staff.  Compliant medications.  She appears to continue 

to be reluctantly for placement of an senior living, though she also continues to ask 

about the possibility of an apartment or motel





Review of Systems


Other


No somatic complaints today





Objective


Alert:  Yes


[place]:  Person, Place, Date


Mood:  Calm, Other (marked decrease in irritability)


Affect:  Restricted


Memory Intact:  Comment (mild impairment)


Hallucinations:  Other (No AVH)


Delusions:  No


Delusion Type:  Other (none)


Suicidal:  Ideation (she denies suicidal ideation)


Homicidal:  Ideation (No HI)


Insight/Judgement


Very poor


Vitals/IOs





 Vital Signs








  Date Time  Temp Pulse Resp B/P Pulse Ox O2 Delivery O2 Flow Rate FiO2


 


12/21/16 18:00 98.8 73 17 124/75 94   








 Intake and Output








 12/21/16 12/21/16 12/22/16





 08:00 16:00 00:00


 


Intake Total 0 ml 1080 ml 1680 ml


 


Balance 0 ml 1080 ml 1680 ml











Assessment & Plan


Problem List:  


(1) [place] disease


ICD Code:  G10


(2) Dementia


ICD Code:  F03.90


Assessment & Plan


Estimated LOS:  days patient continues confused somewhat demented, compliant 

medications, placement continues to remain problematic


Justification for Cont. Inpt.


Patient was really decompensate at a lower level of care


Discharge Planning


To be determined


Request HC Surrog/Guard Advoc?:  Yes





Problem Qualifiers





(1) Dementia:  


Qualified Code:  F02.81 - Dementia associated with other underlying disease 

with behavioral disturbance





Gage Bob MD Dec 22, 2016 13:15

## 2016-12-23 NOTE — HHI.PYPN
Subjective


Remarks


Patient seen in her room with nurse Rosa, chart reviewed, patient continues 

quite angry paranoid focusing on her children's behavior, her believing that 

they had her "arrested" in her placing her in an [facility].  She shows no insight 

into her behaviors that led to this.  Continues to isolate coming out only for 

meals and infrequent activities.  Her hygiene is not will this time saying she 

would rather sponge bath and take a shower though she is becoming somewhat 

malodorous while she denies depression her behavior and affect reflect that.  

Will offer patient Zoloft 25 mg by mouth daily.  Will have staff encourage her 

to shower.  Considering patient's reluctance to consider an prison I wonder 

whether exploring the option of a family group home might be possible





Review of Systems


Other


No somatic complaints today





Objective


Alert:  Yes


[place]:  Person, Place, Date


Mood:  Calm, Other (marked decrease in irritability)


Affect:  Restricted


Memory Intact:  Comment (mild impairment)


Hallucinations:  Other (No AVH)


Delusions:  No


Delusion Type:  Other (none)


Suicidal:  Ideation (she denies suicidal ideation)


Homicidal:  Ideation (No HI)


Insight/Judgement


Poor


Vitals/IOs





 Vital Signs








  Date Time  Temp Pulse Resp B/P Pulse Ox O2 Delivery O2 Flow Rate FiO2


 


12/23/16 06:00 97.6 60 16 101/56 97   








 Intake and Output








 12/22/16 12/22/16 12/23/16





 08:00 16:00 00:00


 


Intake Total  960 ml 1080 ml


 


Balance  960 ml 1080 ml











Assessment & Plan


Problem List:  


(1) [place] disease


ICD Code:  G10


(2) Dementia


ICD Code:  F03.90


Assessment & Plan


Estimated LOS:  days patient continues angry irritable depressed, will add 

Zoloft 25 mg daily to regimen, also encourage staff to help with showering and 

hygiene, also ask staff to explore possibility of a small family group home 

setting


Justification for Cont. Inpt.


At this time the patient was severely decompensated a lower level of care


Discharge Planning


To be determined


Request HC Surrog/Guard Advoc?:  Yes





Problem Qualifiers





(1) Dementia:  


Qualified Code:  F02.81 - Dementia associated with other underlying disease 

with behavioral disturbance





Gage Bob MD Dec 23, 2016 12:17

## 2016-12-24 NOTE — [email or web address]
Subjective


Remarks


Patient seen in her room with floor staff, continues to isolate, continues 

angry at her children blaming them for placement in here and placement issues.  

Patient continues to be resistant to shelter placement, compliant medications





Review of Systems


Other


No somatic complaints today





Objective


Alert:  Yes


[place]:  Person, Place, Date


Mood:  Calm, Other (marked decrease in irritability)


Affect:  Restricted


Memory Intact:  Comment (mild impairment)


Hallucinations:  Other (No AVH)


Delusions:  No


Delusion Type:  Other (none)


Suicidal:  Ideation (she denies suicidal ideation)


Homicidal:  Ideation (No HI)


Insight/Judgement


Very poor


Vitals/IOs





 Vital Signs








  Date Time  Temp Pulse Resp B/P Pulse Ox O2 Delivery O2 Flow Rate FiO2


 


12/23/16 19:46 98.9 75 16 98/56 95   








 Intake and Output








 12/23/16 12/23/16 12/23/16





 07:59 15:59 23:59


 


Intake Total 0 ml 1560 ml 840 ml


 


Balance 0 ml 1560 ml 840 ml











Assessment & Plan


Problem List:  


(1) [place] disease


ICD Code:  G10


(2) Dementia


ICD Code:  F03.90


Assessment & Plan


Estimated LOS:  days patient continue somewhat confused and demented, with 

anger vigilance towards children


Justification for Cont. Inpt.


At this time patient would severely decompensated place to less restrictive 

setting


Discharge Planning


To be determined


Request HC Surrog/Guard Advoc?:  Yes





Problem Qualifiers





(1) Dementia:  


Qualified Code:  F02.81 - Dementia associated with other underlying disease 

with behavioral disturbance





Gage Bob MD Dec 24, 2016 17:02

## 2016-12-25 NOTE — HHI.PYPN
Subjective


Remarks


Patient was seen and case discussed with nursing.  Patient was interviewed in 

bed and is largely seclusive today.  She has been compliant with her Zoloft and 

hopes the medication will help.  She denies suicidal ideation or plan.  

Continues to minimize her admission





Objective


Alert:  Yes


[place]:  Person, Place, Date


Mood:  Calm, Other (marked decrease in irritability)


Affect:  Restricted


Memory Intact:  Comment (mild impairment)


Hallucinations:  Other (No AVH)


Delusions:  No


Delusion Type:  Other (none)


Suicidal:  Ideation (she denies suicidal ideation)


Homicidal:  Ideation (No HI)


Insight/Judgement


Poor


Vitals/IOs





 Vital Signs








  Date Time  Temp Pulse Resp B/P Pulse Ox O2 Delivery O2 Flow Rate FiO2


 


12/24/16 19:51 96.3 65 16 109/77 97   








 Intake and Output








 12/24/16 12/24/16 12/25/16





 08:00 16:00 00:00


 


Intake Total 120 ml 480 ml 840 ml


 


Balance 120 ml 480 ml 840 ml











Assessment & Plan


Problem List:  


(1) [place] disease


ICD Code:  G10


(2) Dementia


ICD Code:  F03.90


Assessment & Plan


Continue current treatment plan


Justification for Cont. Inpt.


Patient will decompensate in a less restrictive setting


Request HC Surrog/Guard Advoc?:  Yes





Problem Qualifiers





(1) Dementia:  


Qualified Code:  F02.81 - Dementia associated with other underlying disease 

with behavioral disturbance





Cayetano Carolina DO Dec 25, 2016 12:19

## 2016-12-26 NOTE — HHI.PYPN
Subjective


Remarks


Patient discussed with treatment team, seen on unit, chart reviewed, patient 

continues angry with her children.  Continues resistant to looking towards half-way 

or placement though when we feel she would accept that placement once that is 

accomplished.  For now continue treatment no change





Review of Systems


Other


No somatic complaints at this time





Objective


Alert:  Yes


[place]:  Person, Place, Date


Mood:  Calm, Other (marked decrease in irritability)


Affect:  Restricted


Memory Intact:  Comment (mild impairment)


Hallucinations:  Other (No AVH)


Delusions:  No


Delusion Type:  Other (none)


Suicidal:  Ideation (she denies suicidal ideation)


Homicidal:  Ideation (No HI)


Insight/Judgement


Poor


Vitals/IOs





 Vital Signs








  Date Time  Temp Pulse Resp B/P Pulse Ox O2 Delivery O2 Flow Rate FiO2


 


12/25/16 19:30 96.7 70 18 116/83 99   








 Intake and Output








 12/25/16 12/25/16 12/26/16





 08:00 16:00 00:00


 


Intake Total  480 ml 480 ml


 


Balance  480 ml 480 ml











Assessment & Plan


Problem List:  


(1) [place] disease


ICD Code:  G10


(2) Dementia


ICD Code:  F03.90


Assessment & Plan


Estimated LOS:  days patient continues confused angry and irritable with 

resistance towards placement, although compliant medications


Justification for Cont. Inpt.


At this time patient would decompensate a place at a lower level of care


Discharge Planning


To be determined


Request HC Surrog/Guard Advoc?:  Yes





Problem Qualifiers





(1) Dementia:  


Qualified Code:  F02.81 - Dementia associated with other underlying disease 

with behavioral disturbance





Gage Bob MD Dec 26, 2016 15:49

## 2016-12-27 NOTE — HHI.PYPN
Subjective


Remarks


Patient seen and a room with nurse Breanna, in bed with covers to her chin.  

Patient continues markedly angry focusing on her children and their 

recommendations for placement in an [facility].  Patient compliant medications..  

Patient appears to be reluctantly accepting that placement recommendations





Review of Systems


Other


No somatic complaints today





Objective


Alert:  Yes


[place]:  Person, Place, Date


Mood:  Calm, Other (marked decrease in irritability)


Affect:  Restricted


Memory Intact:  Comment (mild impairment)


Hallucinations:  Other (No AVH)


Delusions:  No


Delusion Type:  Other (none)


Suicidal:  Ideation (she denies suicidal ideation)


Homicidal:  Ideation (No HI)


Insight/Judgement


Very poor


Vitals/IOs





 Vital Signs








  Date Time  Temp Pulse Resp B/P Pulse Ox O2 Delivery O2 Flow Rate FiO2


 


12/26/16 20:01 98.4 64 17 116/68 97   








 Intake and Output








 12/26/16 12/26/16 12/27/16





 08:00 16:00 00:00


 


Intake Total 120 ml 600 ml 960 ml


 


Balance 120 ml 600 ml 960 ml











Assessment & Plan


Problem List:  


(1) [place] disease


ICD Code:  G10


(2) Dementia


ICD Code:  F03.90


Assessment & Plan


Estimated LOS:  days patient continues confused demented, with anger towards 

her children,


Justification for Cont. Inpt.


At this time patient was significantly decompensated placed in the lower level 

of care


Discharge Planning


To be determined


Request HC Surrog/Guard Advoc?:  Yes





Problem Qualifiers





(1) Dementia:  


Qualified Code:  F02.81 - Dementia associated with other underlying disease 

with behavioral disturbance





Gage Bob MD Dec 27, 2016 09:54

## 2016-12-28 NOTE — HHI.PYPN
Subjective


Remarks


Patient seen in her room patient bed covers to her chin, seen with nurse Rachael, 

patient continues angry irritable focusing on her children.  Continues 

reluctantly voicing opposition to placement in [facility].  For now continue treatment





Review of Systems


Other


No somatic complaints today





Objective


Alert:  Yes


[place]:  Person, Place, Date


Mood:  Calm, Other (marked decrease in irritability)


Affect:  Restricted


Memory Intact:  Comment (mild impairment)


Hallucinations:  Other (No AVH)


Delusions:  No


Delusion Type:  Other (none)


Suicidal:  Ideation (she denies suicidal ideation)


Homicidal:  Ideation (No HI)


Insight/Judgement


Poor


Vitals/IOs





 Vital Signs








  Date Time  Temp Pulse Resp B/P Pulse Ox O2 Delivery O2 Flow Rate FiO2


 


12/27/16 20:20 99.3 68 18 118/60 98   








 Intake and Output








 12/27/16 12/27/16 12/28/16





 08:00 16:00 00:00


 


Intake Total 120 ml 360 ml 700 ml


 


Balance 120 ml 360 ml 700 ml











Assessment & Plan


Problem List:  


(1) [place] disease


ICD Code:  G10


(2) Dementia


ICD Code:  F03.90


Assessment & Plan


Estimated LOS:  days patient continues somewhat confused disoriented, continues 

angry her children continues with some reluctance towards excepting placement 

in care home


Justification for Cont. Inpt.


At this time place would significantly deteriorated placed in the lower level 

of care


Discharge Planning


To be determined


Request HC Surrog/Guard Advoc?:  Yes





Problem Qualifiers





(1) Dementia:  


Qualified Code:  F02.81 - Dementia associated with other underlying disease 

with behavioral disturbance





Gage Bob MD Dec 28, 2016 09:30

## 2016-12-29 NOTE — HHI.PYPN
Subjective


Remarks


Patient seen in day room with nurse Angel, patient calm pleasant with me though 

still shows the anger and irritability when referring to her children and their 

decision on placement.  The patient continues to also show some resistance to 

[facility] placement.  Patient continues also somewhat confused, compliant medications





Review of Systems


Other


No somatic complaints today





Objective


Alert:  Yes


[place]:  Person, Place, Date


Mood:  Calm, Other (marked decrease in irritability)


Affect:  Restricted


Memory Intact:  Comment (mild impairment)


Hallucinations:  Other (No AVH)


Delusions:  No


Delusion Type:  Other (none)


Suicidal:  Ideation (she denies suicidal ideation)


Homicidal:  Ideation (No HI)


Insight/Judgement


Poor


Vitals/IOs





 Vital Signs








  Date Time  Temp Pulse Resp B/P Pulse Ox O2 Delivery O2 Flow Rate FiO2


 


12/28/16 19:54 99.7 76 15 103/57 94   








 Intake and Output








 12/28/16 12/28/16 12/28/16





 07:59 15:59 23:59


 


Intake Total 120 ml 1200 ml 1080 ml


 


Balance 120 ml 1200 ml 1080 ml











Assessment & Plan


Problem List:  


(1) [place] disease


ICD Code:  G10


(2) Dementia


ICD Code:  F03.90


Assessment & Plan


Estimated LOS:  days patient continues somewhat confused, angry with children, 

and resistant to placement.


Justification for Cont. Inpt.


At this time patient will decompensate a placed on the lower level of care


Discharge Planning


To be determined


Request HC Surrog/Guard Advoc?:  Yes





Problem Qualifiers





(1) Dementia:  


Qualified Code:  F02.81 - Dementia associated with other underlying disease 

with behavioral disturbance





Gage Bob MD Dec 29, 2016 13:02

## 2016-12-30 NOTE — HHI.PYPN
Subjective


Remarks


Patient seen in her room with nurse Calli, chart review, patient calm somewhat 

confused, pleasant with me no behavioral problems, though she still remains 

quite angry and upset with her children refusing to speak with them.





Review of Systems


Other


No somatic complaint today





Objective


Alert:  Yes


[place]:  Person, Place, Date


Mood:  Calm, Other (marked decrease in irritability)


Affect:  Restricted


Memory Intact:  Comment (mild impairment)


Hallucinations:  Other (No AVH)


Delusions:  No


Delusion Type:  Other (none)


Suicidal:  Ideation (she denies suicidal ideation)


Homicidal:  Ideation (No HI)


Insight/Judgement


Poor


Vitals/IOs





 Vital Signs








  Date Time  Temp Pulse Resp B/P Pulse Ox O2 Delivery O2 Flow Rate FiO2


 


12/30/16 05:58 97.2 62 18 100/64 95   








 Intake and Output








 12/29/16 12/29/16 12/29/16





 07:59 15:59 23:59


 


Intake Total 120 ml 960 ml 240 ml


 


Balance 120 ml 960 ml 240 ml











Assessment & Plan


Problem List:  


(1) [place] disease


ICD Code:  G10


(2) Dementia


ICD Code:  F03.90


Assessment & Plan


Estimated LOS:  days patient continue somewhat depressed isolating angry with 

children


Justification for Cont. Inpt.


At this time patient will decompensate placed a lower level of care


Discharge Planning


To be determined


Request HC Surrog/Guard Advoc?:  Yes





Problem Qualifiers





(1) Dementia:  


Qualified Code:  F02.81 - Dementia associated with other underlying disease 

with behavioral disturbance





Gage Bob MD Dec 30, 2016 15:16

## 2016-12-31 NOTE — HHI.PYPN
Subjective


Remarks


Patient seen and examined with nursing.  Chart reviewed.  Case discussed with 

nursing staff who reports that patient wants to go to [place] to pursue 

physician assisted suicide for her [place]'s disease.  On my examination 

today, the patient reports that she is feeling a little happier.  She thinks 

that her antidepressant medication might be starting to work.  She denies side 

effects from medications.  She denies any urge to hurt herself on the inpatient 

unit but still says that she would like to avail herself of physician assisted 

suicide.





Review of Systems


Other


No physical complaints today





Objective


Alert:  Yes


[place]:  Person, Place


Mood:  Calm


Affect:  Blunted


Memory Intact:  Comment (not formally assessed)


Hallucinations:  Other (no AVH)


Delusions:  No


Delusion Type:  Other (no delusional material)


Suicidal:  Ideation (no urge to hurt herself on the inpatient psychiatric unit 

but see above)


Homicidal:  Ideation (no HI voiced)


Insight/Judgement


Seems fair


Remarks


Choreiform movements noted but otherwise no abnormal motor movements noted.  

Thought process fairly linear.


Labs


Labs reviewed.  No new labs


Vitals/IOs





 Vital Signs








  Date Time  Temp Pulse Resp B/P Pulse Ox O2 Delivery O2 Flow Rate FiO2


 


12/31/16 06:03 98.5 61 18 92/53 99   








 Intake and Output








 12/30/16 12/30/16 12/31/16





 08:00 16:00 00:00


 


Intake Total 360 ml 840 ml 440 ml


 


Output Total 0 ml  


 


Balance 360 ml 840 ml 440 ml











Assessment & Plan


Problem List:  


(1) [place] disease


ICD Code:  G10


(2) Dementia


ICD Code:  F03.90


Assessment & Plan


Continue current psychotropics as ordered.  Continue other medications include 

care as ordered.  I do note patient's blood pressure has been a little on the 

low side, but this is within the historical range for her.


Justification for Cont. Inpt.


Risk for decompensation.


Discharge Planning


Per Dr. Bob


Request HC Surrog/Guard Advoc?:  Yes





Problem Qualifiers





(1) Dementia:  


Qualified Code:  F02.81 - Dementia associated with other underlying disease 

with behavioral disturbance





José Mccray MD Dec 31, 2016 09:02

## 2017-01-01 NOTE — HHI.PYPN
Subjective


Remarks


Patient seen and examined with nursing staff.  Chart reviewed.  Case discussed 

with nursing staff who reports patient has been medication compliant.  On my 

examination today, patient reports that she is feeling "happier each day."  

This seems a little bit superficial and forced today.  Denies side effects from 

medications.  No SI or HI voiced.





Review of Systems


Other


No new physical complaints today





Objective


Alert:  Yes


[place]:  Person, Place


Mood:  Calm


Affect:  Other (fairly full and reactive)


Memory Intact:  Comment (not assessed)


Hallucinations:  Other (no AVH)


Delusions:  No


Delusion Type:  Other (no delusions)


Suicidal:  Ideation (no SI)


Homicidal:  Ideation (no HI)


Insight/Judgement


Fair to poor


Remarks


Thought processes linear


Labs


Labs reviewed.  No new labs.


Vitals/IOs





 Vital Signs








  Date Time  Temp Pulse Resp B/P Pulse Ox O2 Delivery O2 Flow Rate FiO2


 


12/31/16 19:20 98.0 71 18 120/80 97   








 Intake and Output








 12/31/16 12/31/16 1/1/17





 08:00 16:00 00:00


 


Intake Total 360 ml 840 ml 480 ml


 


Balance 360 ml 840 ml 480 ml











Assessment & Plan


Problem List:  


(1) [place] disease


ICD Code:  G10


(2) Dementia


ICD Code:  F03.90


Assessment & Plan


Continue current psychotropics as ordered.  Continue other medications and care 

as ordered.


Justification for Cont. Inpt.


Risk for decompensation


Discharge Planning


Per Dr. Bob


Request HC Surrog/Guard Advoc?:  Yes





Problem Qualifiers





(1) Dementia:  


Qualified Code:  F02.81 - Dementia associated with other underlying disease 

with behavioral disturbance





José Mccray MD Jan 1, 2017 12:16

## 2017-01-02 NOTE — HHI.PYPN
Subjective


Remarks


Patient discussed with treatment team and patient's son, chart review, patient 

seen on unit.  Patient remains overall calm pleasant with us though continues 

to isolate, continues markedly angry and irritated with her children concerning 

their position on her returning home or placement issues.  Continue to work 

with children to find appropriate placement.  Patient compliant medications 

does denies suicidality





Review of Systems


Except as stated in HPI:  all other systems reviewed are Neg





Objective


Alert:  Yes


[place]:  Person, Place


Mood:  Calm


Affect:  Other (fairly full and reactive)


Memory Intact:  Comment (not assessed)


Hallucinations:  Other (no AVH)


Delusions:  No


Delusion Type:  Other (no delusions)


Suicidal:  Ideation (no SI)


Homicidal:  Ideation (no HI)


Insight/Judgement


Very poor


Vitals/IOs





 Vital Signs








  Date Time  Temp Pulse Resp B/P Pulse Ox O2 Delivery O2 Flow Rate FiO2


 


1/2/17 06:00 97.3 59 17 105/59 95   








 Intake and Output








 1/1/17 1/1/17 1/1/17





 07:59 15:59 23:59


 


Intake Total 0 ml 1680 ml 1080 ml


 


Output Total 1 ml  


 


Balance -1 ml 1680 ml 1080 ml











Assessment & Plan


Problem List:  


(1) [place] disease


ICD Code:  G10


(2) Dementia


ICD Code:  F03.90


Assessment & Plan


Estimated LOS:  days patient continues somewhat depressed and confused, though 

coping with stress related to placement issues, however continues markedly 

angry focused on children


Justification for Cont. Inpt.


At this time patient will decompensate a placed a lower level of care


Discharge Planning


To be determined


Request HC Surrog/Guard Advoc?:  Yes





Problem Qualifiers





(1) Dementia:  


Qualified Code:  F02.81 - Dementia associated with other underlying disease 

with behavioral disturbance





Gage Bob MD Jan 2, 2017 16:05

## 2017-01-03 NOTE — HHI.PYPN
Subjective


Remarks


Patient seen in day room with nurse Rachael, chart reviewed, patient continues 

calm and pleasant with me, compliant medications, thoroughly anger episodes a 

related to references to her children and placement issues.  There appears to 

be some lessening of cognitive issues initially noted with slightly





Review of Systems


Except as stated in HPI:  all other systems reviewed are Neg





Objective


Alert:  Yes


[place]:  Person, Place


Mood:  Calm


Affect:  Other (fairly full and reactive)


Memory Intact:  Comment (not assessed)


Hallucinations:  Other (no AVH)


Delusions:  No


Delusion Type:  Other (no delusions)


Suicidal:  Ideation (no SI)


Homicidal:  Ideation (no HI)


Insight/Judgement


Very poor


Vitals/IOs





 Vital Signs








  Date Time  Temp Pulse Resp B/P Pulse Ox O2 Delivery O2 Flow Rate FiO2


 


1/2/17 19:35 99.3 75 18 123/75    


 


1/2/17 06:00     95   








 Intake and Output








 1/2/17 1/2/17 1/3/17





 08:00 16:00 00:00


 


Intake Total  1320 ml 840 ml


 


Balance  1320 ml 840 ml











Assessment & Plan


Problem List:  


(1) [place] disease


ICD Code:  G10


(2) Dementia


ICD Code:  F03.90


Assessment & Plan


Estimated LOS:  days patient continue somewhat confused though more oriented 

than earlier in this admission, compliant medications, for now continue 

treatment


Justification for Cont. Inpt.


At this time patient would decompensated placed in a lower level of care


Discharge Planning


To be determined


Request HC Surrog/Guard Advoc?:  Yes





Problem Qualifiers





(1) Dementia:  


Qualified Code:  F02.81 - Dementia associated with other underlying disease 

with behavioral disturbance





Gage Bob MD Nils 3, 2017 12:01

## 2017-01-04 NOTE — HHI.PYPN
Subjective


Remarks


Patient seen in her room with nurse Rachael, chart review, patient continues to 

isolate the no behavior problems on the unit.  When she does come out to eat 

she is appropriate with the other patient's intertable.  Appears her 

orientation is somewhat improving from admission.  Need to consider other 

alternative diagnoses





Review of Systems


Except as stated in HPI:  all other systems reviewed are Neg





Objective


Alert:  Yes


[place]:  Person, Place


Mood:  Calm


Affect:  Other (fairly full and reactive)


Memory Intact:  Comment (not assessed)


Hallucinations:  Other (no AVH)


Delusions:  No


Delusion Type:  Other (no delusions)


Suicidal:  Ideation (no SI)


Homicidal:  Ideation (no HI)


Insight/Judgement


Poor


Vitals/IOs





 Vital Signs








  Date Time  Temp Pulse Resp B/P Pulse Ox O2 Delivery O2 Flow Rate FiO2


 


1/4/17 05:21 98.5 67 16 87/54    


 


1/3/17 18:47     95   








 Intake and Output








 1/3/17 1/3/17 1/3/17





 07:59 15:59 23:59


 


Intake Total 480 ml 1320 ml 240 ml


 


Balance 480 ml 1320 ml 240 ml











Assessment & Plan


Problem List:  


(1) [place] disease


ICD Code:  G10


(2) Dementia


ICD Code:  F03.90


Assessment & Plan


Estimated LOS:  days patient somewhat calmer focused no behavioral issues, 

continues markedly angry at her children for putting her in here


Justification for Cont. Inpt.


At this time patient decompensated placed on lower level of care


Discharge Planning


To be determined


Request HC Surrog/Guard Advoc?:  Yes





Problem Qualifiers





(1) Dementia:  


Qualified Code:  F02.81 - Dementia associated with other underlying disease 

with behavioral disturbance





Gage Bob MD Jan 4, 2017 10:04

## 2017-01-05 NOTE — HHI.DS
Psychiatry Discharge Summary


Inpatient Psychiatric care?:  Yes


Advance Directive:  Yes


Mental Health AdvanceDirective:  No


Health Care Proxy:  No


Admission


Admission Date


Nov 21, 2016 at 10:11


Admission Diagnosis:  


(1) Major depressive disorder, recurrent, severe with psychotic features


ICD Code:  F33.3


(2) Dementia


ICD Code:  F03.90


Brief History


The patient is a 60-year-old  woman, domicile with her son, unemployed

, disabled, without any previous psychiatric history, no previous suicidal 

attempts, medical history of [place]'s disease [facility].  Per the report her son who also has [place]'s called police 

because his mother has been expressing suicidal ideation lately.  When they 

arrived the patient locked the doors and refused to let them in the sun and 

opened a back door and when they went in she was asking if euthanasia was legal 

in [place] and if the hospital would give her morphine so she could sleep and 

"go home to [facility] ".  As per ER notes, The patient denies any suicide attempts 

but states that she has been considering suicide or euthanasia as an option 

when her disease progresses.  Chart was reviewed, case discussed with ER 

medical staff, no collateral information available, patient was seen and 

evaluated in the [facility].  On evaluation patient states that she doesn't need to 

be here, she is states that the reason she was brought to the hospital is 

because her son doesn't want to pay the rent and wants to get rid of her.  

Patient denies depressive symptoms, she denies anhedonia, she denies 

hopelessness, helplessness, she denies suicidal or homicidal ideation.  However

, she does states that soon or later as per [place]'s disease is 

deteriorated her health she is going to look for options to end her life in a 

painless manner, but she is now planning to commit suicide.  At this moment, 

she says, she still enjoy life, is able to function, to take care of herself, 

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 

these abilities.  Patient denies past or current devonte, denies anxiety, denies 

perceptual disturbances, such as visual and auditory hallucinations.  Patient 

is fully oriented 3, no gross cognitive impairment observed. 





11/22/16


Above note dictated by Dr. Herron review noted and agreed with.  Patient is 

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 Herron 

to Dr. johnson nurse service.  Patient chart reviewed and patient seen with nurse 

eye on unit.  Dr. membreno his sign first opinion petition supporting Haddad act.  

I agree.  Patient meets criteria for involuntary psychiatric hospitalization 

under the [place] act thus I will cosign second opinion petition supporting [place] 

act


Tobacco Use In Past 30 Days:  5 or More Cigarettes/Day


Alcohol Use:  Never


Hospital Course


Patient's initial hospital course showed marked anger, paranoia, resistance to 

medication.  Much of her anger focused on her children's behaviors leading to 

the Haddad act and the patient's hospitalization however after counseling and 

treatment with the patient she became more cooperative compliance with 

medications.  This has led to the patient's showing a marked softening and her 

paranoia and irritability and increased focus and processing


Communication with us.  Her anger towards her children remains related to 

placement.  The patient would like to be discharged to either a hotel own 

apartment is my opinion that that would not be the best placement subsequent to 

an acute psychiatric hospitalization.  The patient's daughter who is her 

guardian advocate agrees that an halfway placement would be appropriate.  [facility] 

is into the patient and they feel she is appropriate for their facility.  This 

is also agreeable to patient's daughter.  Patient showing some continued 

resistance to this but I did explain to her the need for and the potential for 

progression to independent living.  Patient to be discharged as soon as bed 

available at [facility] with a Rx 1 month [facility] for 

further outpatient mental health care





Results


Blood Pressure


109 / 62





 Vital Signs








  Date Time  Temp Pulse Resp B/P Pulse Ox O2 Delivery O2 Flow Rate FiO2


 


1/5/17 06:02 97.6 70 18 109/62 97   








Urine toxicology negative


Summary of Procedures


None done


Pending results at discharge:  No





Medications


# of Antipsychotic meds at D/C:  1


Approp Antipsych med options


1 - Minimum of three failed multiple trials of monotherapy.


2 - Documented plan to taper to monotherapy due to previous use of multiple 

meds OR cross-taper in progress at D/C.


3 - Documentation of augmentation of Clozapine.


4 - Justification other than those listed in allowable values 1-3, document here

:





Discharge


Discharge Date:  Jan 5, 2017


Discharge Diagnosis:  


(1) Major depressive disorder, recurrent, severe with psychotic features


Diagnosis:  Principal


ICD Code:  F33.3


(2) Dementia


Diagnosis:  Secondary


ICD Code:  F03.90


(3) Ciarra disease


Diagnosis:  Principal


ICD Code:  G10


Mental Status Exam at Disch


Alert fairly well oriented white female appearing her stated age laying in her 

bed, [place]'s movements continue no change.  Otherwise patient normal 

active her affect shows some increased range and intensity mood is euthymic to 

mildly dysphoric, and no auditory or visual hallucinations no delusions noted 

insight and judgment is poor cognition appears overall slightly improved from 

admission


Pt Condition on Discharge:  Stable


Discharge Disposition:  [facility]





Discharge Instructions


Diet Instructions:  As Tolerated, No Restrictions


Activities you can perform:  Regular-No Restrictions


Scheduled Appointment:  [facility]





Discharge Time


> 30 minutes





Discharge/Advance Care Plan


Health Problems:  


(1) [place] disease


(2) Dementia


Goals to promote your health


* To prevent worsening of your condition and complications


* To maintain your health at the optimal level


Directions to meet your goals


*** Take your medications as prescribed


***  Follow your dietary instruction


***  Follow activity as directed





***  Keep your appointments as scheduled


***  Take your immunizations and boosters as scheduled


***  If your symptoms worsen call your PCP, if no PCP go to Urgent Care Center 

or Emergency Room ***


***  For 24/7 questions related to your inpatient stay or results of tests 

pending at discharge, please contact Dr. Gage Bob at [phone]


***  Smoking is Dangerous to Your Health. Avoid second hand smoking ***





Problem Qualifiers





(1) Dementia:  


Qualified Code:  F02.81 - Dementia associated with other underlying disease 

with behavioral disturbance





Gage Bob MD Jan 5, 2017 11:47

## 2017-01-05 NOTE — HHI.PYPN
Subjective


Remarks


Patient seen in her room with counselor Rocio, it appears that may be a bed 

available for her at [place] the next 1-2 days.  Patient's daughter will be 

looking at this facility.  Patient continues show some resistance to any 

placement except staying here indefinitely if she does not get discharged to 

hotel or motel.  I told the patient that when the beds available she will be 

transferred to the [facility] has a next step and her becoming more independent.  That 

she needs to show by her behavior that she has the ability to live 

independently.  Patient appeared to be listening and processing that.  For now 

continue treatment no change





Review of Systems


Except as stated in HPI:  all other systems reviewed are Neg





Objective


Alert:  Yes


[place]:  Person, Place


Mood:  Calm


Affect:  Other (fairly full and reactive)


Memory Intact:  Comment (not assessed)


Hallucinations:  Other (no AVH)


Delusions:  No


Delusion Type:  Other (no delusions)


Suicidal:  Ideation (no SI)


Homicidal:  Ideation (no HI)


Insight/Judgement


Poor


Vitals/IOs





 Vital Signs








  Date Time  Temp Pulse Resp B/P Pulse Ox O2 Delivery O2 Flow Rate FiO2


 


1/5/17 06:02 97.6 70 18 109/62 97   








 Intake and Output








 1/4/17 1/4/17 1/4/17





 07:59 15:59 23:59


 


Intake Total  1200 ml 720 ml


 


Balance  1200 ml 720 ml











Assessment & Plan


Problem List:  


(1) [place] disease


ICD Code:  G10


(2) Dementia


ICD Code:  F03.90


Assessment & Plan


Estimated LOS:  days patient continues angry irritable focused primarily on her 

children and reluctance to acknowledges the need for an senior living placement.  It 

appears a placement is available the next few days


Justification for Cont. Inpt.


At this time patient will decompensate if placed in a lower level of care


Discharge Planning


To be determined


Request HC Surrog/Guard Advoc?:  Yes





Problem Qualifiers





(1) Dementia:  


Qualified Code:  F02.81 - Dementia associated with other underlying disease 

with behavioral disturbance





Gage Bob MD Jan 5, 2017 10:17

## 2017-01-06 NOTE — HHI.PYPN
Subjective


Remarks


Patient seen in room today.  Discussed in further detail her discharge today to 

local [facility], and how they could benefit her in her further recovery/

rehabilitation.  Patient initially showed resistance to leaving here stating "I 

am not Benadryl I'm going to stay here" however after discussion and setting 

limits with this lady she agreed to it realizing that it is an open unit and 

she will have free ingress and egress.  Patient stated she is willing to go 

today Rx's have been written, with follow-up through [facility] act





Review of Systems


Except as stated in HPI:  all other systems reviewed are Neg





Objective


Alert:  Yes


[place]:  Person, Place, Date


Mood:  Calm


Affect:  Other (fairly full and reactive)


Memory Intact:  Comment (fair)


Hallucinations:  Other (denies)


Delusions:  No


Delusion Type:  Other (no delusions)


Suicidal:  Ideation (no SI)


Homicidal:  Ideation (no HI)


Insight/Judgement


Poor to fair


Vitals/IOs





 Vital Signs








  Date Time  Temp Pulse Resp B/P Pulse Ox O2 Delivery O2 Flow Rate FiO2


 


1/6/17 05:16 97.8 70 16 102/60 96   








 Intake and Output








 1/5/17 1/5/17 1/6/17





 08:00 16:00 00:00


 


Intake Total  1800 ml 0 ml


 


Balance  1800 ml 0 ml











Assessment & Plan


Problem List:  


(1) [place] disease


ICD Code:  G10


(2) Dementia


ICD Code:  F03.90


Assessment & Plan


Estimated LOS:  days there is no other available patient to be discharged to 

[facility] today Rx 1 month to follow through [facility] act


Justification for Cont. Inpt.


Patient to be discharged today to [facility]


Discharge Planning


Patient to be discharged today to [facility]


Request HC Surrog/Guard Advoc?:  Yes





Problem Qualifiers





(1) Dementia:  


Qualified Code:  F02.81 - Dementia associated with other underlying disease 

with behavioral disturbance





Gage Bob MD Jan 6, 2017 11:00

## 2018-04-09 ENCOUNTER — HOSPITAL ENCOUNTER (EMERGENCY)
Dept: HOSPITAL 17 - NEPD | Age: 63
Discharge: HOME | End: 2018-04-09
Payer: COMMERCIAL

## 2018-04-09 VITALS — WEIGHT: 98.22 LBS | HEIGHT: 64 IN | BODY MASS INDEX: 16.77 KG/M2

## 2018-04-09 VITALS
DIASTOLIC BLOOD PRESSURE: 80 MMHG | OXYGEN SATURATION: 98 % | RESPIRATION RATE: 18 BRPM | HEART RATE: 82 BPM | SYSTOLIC BLOOD PRESSURE: 124 MMHG

## 2018-04-09 VITALS
HEART RATE: 98 BPM | TEMPERATURE: 99.6 F | RESPIRATION RATE: 18 BRPM | DIASTOLIC BLOOD PRESSURE: 82 MMHG | SYSTOLIC BLOOD PRESSURE: 121 MMHG | OXYGEN SATURATION: 98 %

## 2018-04-09 DIAGNOSIS — S52.601A: ICD-10-CM

## 2018-04-09 DIAGNOSIS — S52.501A: Primary | ICD-10-CM
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
## 2018-04-09 LAB
BASOPHILS # BLD AUTO: 0.1 TH/MM3 (ref 0–0.2)
BASOPHILS NFR BLD: 0.8 % (ref 0–2)
BUN SERPL-MCNC: 12 MG/DL (ref 7–18)
CALCIUM SERPL-MCNC: 8.9 MG/DL (ref 8.5–10.1)
CHLORIDE SERPL-SCNC: 108 MEQ/L (ref 98–107)
CREAT SERPL-MCNC: 0.81 MG/DL (ref 0.5–1)
EOSINOPHIL # BLD: 0.1 TH/MM3 (ref 0–0.4)
EOSINOPHIL NFR BLD: 0.7 % (ref 0–4)
ERYTHROCYTE [DISTWIDTH] IN BLOOD BY AUTOMATED COUNT: 15 % (ref 11.6–17.2)
GFR SERPLBLD BASED ON 1.73 SQ M-ARVRAT: 72 ML/MIN (ref 89–?)
GLUCOSE SERPL-MCNC: 101 MG/DL (ref 74–106)
HCO3 BLD-SCNC: 27.2 MEQ/L (ref 21–32)
HCT VFR BLD CALC: 43.3 % (ref 35–46)
HGB BLD-MCNC: 14.4 GM/DL (ref 11.6–15.3)
INR PPP: 1 RATIO
LYMPHOCYTES # BLD AUTO: 2.7 TH/MM3 (ref 1–4.8)
LYMPHOCYTES NFR BLD AUTO: 23.7 % (ref 9–44)
MCH RBC QN AUTO: 31 PG (ref 27–34)
MCHC RBC AUTO-ENTMCNC: 33.2 % (ref 32–36)
MCV RBC AUTO: 93.5 FL (ref 80–100)
MONOCYTE #: 0.6 TH/MM3 (ref 0–0.9)
MONOCYTES NFR BLD: 5.6 % (ref 0–8)
NEUTROPHILS # BLD AUTO: 7.9 TH/MM3 (ref 1.8–7.7)
NEUTROPHILS NFR BLD AUTO: 69.2 % (ref 16–70)
PLATELET # BLD: 437 TH/MM3 (ref 150–450)
PMV BLD AUTO: 7.3 FL (ref 7–11)
PROTHROMBIN TIME: 10.1 SEC (ref 9.8–11.6)
RBC # BLD AUTO: 4.63 MIL/MM3 (ref 4–5.3)
SODIUM SERPL-SCNC: 140 MEQ/L (ref 136–145)
WBC # BLD AUTO: 11.4 TH/MM3 (ref 4–11)

## 2018-04-09 PROCEDURE — 73100 X-RAY EXAM OF WRIST: CPT

## 2018-04-09 PROCEDURE — 85610 PROTHROMBIN TIME: CPT

## 2018-04-09 PROCEDURE — 80048 BASIC METABOLIC PNL TOTAL CA: CPT

## 2018-04-09 PROCEDURE — 71045 X-RAY EXAM CHEST 1 VIEW: CPT

## 2018-04-09 PROCEDURE — 73110 X-RAY EXAM OF WRIST: CPT

## 2018-04-09 PROCEDURE — 85025 COMPLETE CBC W/AUTO DIFF WBC: CPT

## 2018-04-09 PROCEDURE — 25605 CLTX DST RDL FX/EPHYS SEP W/: CPT

## 2018-04-09 PROCEDURE — 85730 THROMBOPLASTIN TIME PARTIAL: CPT

## 2018-04-09 NOTE — RADRPT
EXAM DATE/TIME:  04/09/2018 14:15 

 

[facility] COMPARISON:     

No previous studies available for comparison.

 

                     

INDICATIONS :     

Shortness of breath.

                     

 

MEDICAL HISTORY :            

[place] disease   

 

SURGICAL HISTORY :     

None.   

 

ENCOUNTER:     

Initial                                        

 

ACUITY:     

1 day      

 

PAIN SCORE:     

0/10

 

LOCATION:     

Bilateral chest 

 

FINDINGS:     

A single view of the chest demonstrates the lungs to be symmetrically aerated without evidence of mas
s, infiltrate or effusion.  The cardiomediastinal contours are unremarkable.  Osseous structures are 
intact.

 

CONCLUSION:     

No acute cardiopulmonary disease.

 

 

 

 José Jack MD on April 09, 2018 at 14:45           

Board Certified Radiologist.

 This report was verified electronically.

## 2018-04-09 NOTE — PD
HPI


Chief Complaint:  Injury


Time Seen by Provider:  16:15


Travel History


International Travel<30 days:  No


Contact w/Intl Traveler<30days:  No


Traveled to known affect area:  No





History of Present Illness


HPI


Patient slipped coming out of the shower at approximately 11:00 this morning.  

States that she did not hit her head no LOC but she did break the fall with her 

right arm.  Patient is left-handed, and she is complaining of right wrist pain 

with obvious right wrist deformity.  She was ambulatory after the fall.  

Patient currently reports pain numbness and tingling in the right wrist.





Critical access hospital


Past Medical History


*** Narrative Medical


Randall's disease


Arthritis:  No


Seizures:  No


Influenza Vaccination:  Yes





Past Surgical History


Abdominal Surgery:  No


Cardiac Surgery:  No


 Section:  Yes


Ear Surgery:  No


Endocrine Surgery:  No


Eye Surgery:  No


Genitourinary Surgery:  No


Gynecologic Surgery:  No


Oral Surgery:  No


Thoracic Surgery:  No





Social History


Alcohol Use:  No


Tobacco Use:  Yes


Substance Use:  No





Allergies-Medications


(Allergen,Severity, Reaction):  


Coded Allergies:  


     No Known Allergies (Unverified , 16)


Reported Meds & Prescriptions





Reported Meds & Active Scripts


Active


Zoloft (Sertraline HCl) 25 Mg Tab 25 Mg PO DAILY


Zyprexa Zydis (Olanzapine) 20 Mg Tab 20 Mg PO HS








Review of Systems


Except as stated in HPI:  all other systems reviewed are Neg





Physical Exam


Narrative


GENERAL: No acute distress


SKIN: Focused skin assessment warm/dry.


HEAD: Atraumatic. Normocephalic. 


EYES: Extraocular muscles intact bilaterally


ENT: No nasal bleeding or discharge.  Poor dentition


NECK: Supple full range of motion


CARDIOVASCULAR: Regular rate and rhythm.  No murmur appreciated.


RESPIRATORY: No accessory muscle use. Clear to auscultation. Breath sounds 

equal bilaterally. 


GASTROINTESTINAL: Abdomen soft, non-tender, nondistended. Hepatic and splenic 

margins not palpable. 


MUSCULOSKELETAL: Obvious left wrist deformity.  Cap refill less than 3 seconds.

  Sensation intact.  Unable to assess median ulnar radial radial nerves 

secondary to pain 


NEUROLOGICAL: Awake and alert. 


PSYCHIATRIC: Appropriate mood and affect; insight and judgment normal.





Data


Data


Last Documented VS





Vital Signs








  Date Time  Temp Pulse Resp B/P (MAP) Pulse Ox O2 Delivery O2 Flow Rate FiO2


 


18 16:07  82 18 124/80 (95) 98 Room Air  


 


18 13:27 99.6       








Orders





 Orders


Complete Blood Count With Diff (18 13:29)


Basic Metabolic Panel (Bmp) (18 13:29)


Act Partial Throm Time (Ptt) (18 13:29)


Prothrombin Time / Inr (Pt) (18 13:29)


Wrist, Complete (Fyt5gxn) (18 )


Chest, Single Ap (18 )


Sling Cradle Arm (18 )


Lidocaine 1% Inj (Xylocaine 1% Inj) (18 17:00)


Sling Cradle Arm (18 )


Wrist, Limited (Ap&Lat) (18 )


Acetaminophen (Tylenol) (18 17:30)


Fiberglass Sugartong Sp Ad Arm (18 )


Sling Cradle Arm (18 )





Labs





Laboratory Tests








Test


  18


16:35


 


White Blood Count 11.4 TH/MM3 


 


Red Blood Count 4.63 MIL/MM3 


 


Hemoglobin 14.4 GM/DL 


 


Hematocrit 43.3 % 


 


Mean Corpuscular Volume 93.5 FL 


 


Mean Corpuscular Hemoglobin 31.0 PG 


 


Mean Corpuscular Hemoglobin


Concent 33.2 % 


 


 


Red Cell Distribution Width 15.0 % 


 


Platelet Count 437 TH/MM3 


 


Mean Platelet Volume 7.3 FL 


 


Neutrophils (%) (Auto) 69.2 % 


 


Lymphocytes (%) (Auto) 23.7 % 


 


Monocytes (%) (Auto) 5.6 % 


 


Eosinophils (%) (Auto) 0.7 % 


 


Basophils (%) (Auto) 0.8 % 


 


Neutrophils # (Auto) 7.9 TH/MM3 


 


Lymphocytes # (Auto) 2.7 TH/MM3 


 


Monocytes # (Auto) 0.6 TH/MM3 


 


Eosinophils # (Auto) 0.1 TH/MM3 


 


Basophils # (Auto) 0.1 TH/MM3 


 


CBC Comment DIFF FINAL 


 


Differential Comment  


 


Prothrombin Time 10.1 SEC 


 


Prothromb Time International


Ratio 1.0 RATIO 


 


 


Activated Partial


Thromboplast Time 24.8 SEC 


 


 


Blood Urea Nitrogen 12 MG/DL 


 


Creatinine 0.81 MG/DL 


 


Random Glucose 101 MG/DL 


 


Calcium Level 8.9 MG/DL 


 


Sodium Level 140 MEQ/L 


 


Potassium Level 4.2 MEQ/L 


 


Chloride Level 108 MEQ/L 


 


Carbon Dioxide Level 27.2 MEQ/L 


 


Anion Gap 5 MEQ/L 


 


Estimat Glomerular Filtration


Rate 72 ML/MIN 


 











MDM


Medical Decision Making


Medical Screen Exam Complete:  Yes


Emergency Medical Condition:  Yes


Interpretation(s)





Last Impressions





Last Impressions








Wrist X-Ray 18 0000 Signed





Impressions: 





 Service Date/Time:  2018 14:09 - CONCLUSION:  Acute displaced 





 and angulated fractures involving right distal radius and ulna.     Jon Murray MD 


 


Chest X-Ray 18 0000 Signed





Impressions: 





 Service Date/Time:  2018 14:15 - CONCLUSION:  No acute 





 cardiopulmonary disease.     José Jack MD 








Differential Diagnosis


Fracture, dislocation


Narrative Course


Patient presents to ER with R wrist deformity. XRays ordered in triage which 

showed fracture and dislocation of wrist. Labs were also sent from triage which 

showed slight increase in wbc count. Called Ortho on call and spoke to PA, who 

advised to attempt reduction and place in sugar tong and have patient followup. 

Patient was consented for hematoma block and reduction. See procedure note. 

Patient given tylenol po for pain after reduction. Post reduction XR done.





Procedures


**Procedure Narrative**


Area prepped with chloroprep and hematoma block done. Approximately 5cc of 1% 

lidocaine without epi injected into hematoma after blood aspirated. Patient 

reported area being numb, and reduction was then attempted. Patient was placed 

in sugar tong splint. She was neurovascularly intact after the reduction. 





Post reduction XR: 


 


FINDINGS:     


AP and lateral views following reduction and casting of right distal radial and 

ulnar impacted fractures. There is improved impaction as well as ulnar 

deviation of both distal fragments with persistent palmar angulation 

particularly of the radius. No additional acute fractures are identified.


 


CONCLUSION:     


1. Status post reduction and casting of distal radial and ulnar fractures, as 

above.


EKG Prior to Arrival:  No





Physician Communication


Physician Communication


Spoke to Dr. Najera's PA, who advised to attempt reduction, place in sugar tong 

splint, and have patient followup. After reduction done, he stated to have 

patient f/u either Wednesday, Thursday, or Monday.





Diagnosis





 Primary Impression:  


 Wrist fracture, closed


 Qualified Codes:  S62.101A - Fracture of unspecified carpal bone, right wrist, 

initial encounter for closed fracture


Referrals:  


Simon Najera MD


Patient Instructions:  General Instructions





***Additional Instructions:  


Pain meds as needed. Followup with Orthopedics, Dr. Najera, on either Wednesday

, Thursday, or next Monday. Return to ER immediately for increase pain or 

swelling in R arm, color changes, loss of sensation, or for any new/worrisome/

worsening symptoms.


Disposition:  01 DISCHARGE HOME


Condition:  Ana Maria Shaver MD 2018 16:31

## 2018-04-09 NOTE — RADRPT
EXAM DATE/TIME:  04/09/2018 14:09 

 

[facility] COMPARISON:     

No previous studies available for comparison.

 

                     

INDICATIONS :     

Right wrist pain, fall.

                     

 

MEDICAL HISTORY :     

None.          

 

SURGICAL HISTORY :     

None.   

 

ENCOUNTER:     

Initial                                        

 

ACUITY:     

1 day      

 

PAIN SCORE:     

6/10

 

LOCATION:     

Right medial wrist

 

FINDINGS:     

Acute displaced and angulated fractures involving the right distal radius and ulna are noted.

 

CONCLUSION: 

Acute displaced and angulated fractures involving right distal radius and ulna. 

 

 

 Jon Murray MD on April 09, 2018 at 14:42           

Board Certified Radiologist.

 This report was verified electronically.

## 2018-04-09 NOTE — RADRPT
EXAM DATE/TIME:  04/09/2018 17:33 

 

[facility] COMPARISON:     

WRIST RIGHT COMPLETE ([id number]), April 09, 2018, 14:09.

 

                     

INDICATIONS :     

Post reduction.

                     

 

MEDICAL HISTORY :     

None.          

 

SURGICAL HISTORY :     

None.   

 

ENCOUNTER:     

Subsequent                                        

 

ACUITY:     

1 day      

 

PAIN SCORE:     

5/10

 

LOCATION:     

Right  wrist.

 

FINDINGS:     

AP and lateral views following reduction and casting of right distal radial and ulnar impacted fractu
res. There is improved impaction as well as ulnar deviation of both distal fragments with persistent 
palmar angulation particularly of the radius. No additional acute fractures are identified.

 

CONCLUSION:     

1. Status post reduction and casting of distal radial and ulnar fractures, as above.

 

 

 

 John Addison MD on April 09, 2018 at 17:43           

Board Certified Radiologist.

 This report was verified electronically.